# Patient Record
Sex: MALE | Race: WHITE | NOT HISPANIC OR LATINO | Employment: OTHER | URBAN - METROPOLITAN AREA
[De-identification: names, ages, dates, MRNs, and addresses within clinical notes are randomized per-mention and may not be internally consistent; named-entity substitution may affect disease eponyms.]

---

## 2019-05-03 ENCOUNTER — TELEPHONE (OUTPATIENT)
Dept: INTERNAL MEDICINE | Facility: CLINIC | Age: 69
End: 2019-05-03

## 2019-05-03 NOTE — TELEPHONE ENCOUNTER
Patient  Would like to know if we received his records from [place]. He asked if you would called and leave him a voice message.

## 2019-05-07 ENCOUNTER — HOSPITAL ENCOUNTER (OUTPATIENT)
Dept: GENERAL RADIOLOGY | Facility: HOSPITAL | Age: 69
Discharge: HOME | End: 2019-05-07
Admitting: INTERNAL MEDICINE

## 2019-05-07 ENCOUNTER — OFFICE VISIT (OUTPATIENT)
Dept: INTERNAL MEDICINE | Facility: CLINIC | Age: 69
End: 2019-05-07

## 2019-05-07 VITALS
HEIGHT: 71 IN | OXYGEN SATURATION: 99 % | TEMPERATURE: 97.9 F | SYSTOLIC BLOOD PRESSURE: 128 MMHG | DIASTOLIC BLOOD PRESSURE: 86 MMHG | BODY MASS INDEX: 29.9 KG/M2 | HEART RATE: 54 BPM | WEIGHT: 213.6 LBS | RESPIRATION RATE: 18 BRPM

## 2019-05-07 DIAGNOSIS — E78.5 HYPERLIPIDEMIA, UNSPECIFIED HYPERLIPIDEMIA TYPE: ICD-10-CM

## 2019-05-07 DIAGNOSIS — I45.10 RBBB: ICD-10-CM

## 2019-05-07 DIAGNOSIS — E03.9 HYPOTHYROIDISM, UNSPECIFIED TYPE: ICD-10-CM

## 2019-05-07 DIAGNOSIS — R10.12 LUQ PAIN: ICD-10-CM

## 2019-05-07 DIAGNOSIS — N52.31 ERECTILE DYSFUNCTION AFTER RADICAL PROSTATECTOMY: ICD-10-CM

## 2019-05-07 DIAGNOSIS — K63.5 BENIGN COLON POLYP: ICD-10-CM

## 2019-05-07 DIAGNOSIS — G89.29 CHRONIC PAIN OF RIGHT THUMB: ICD-10-CM

## 2019-05-07 DIAGNOSIS — C61 PROSTATE CA (HCC): ICD-10-CM

## 2019-05-07 DIAGNOSIS — I10 ESSENTIAL HYPERTENSION: ICD-10-CM

## 2019-05-07 DIAGNOSIS — M79.644 CHRONIC PAIN OF RIGHT THUMB: ICD-10-CM

## 2019-05-07 DIAGNOSIS — F41.9 ANXIETY: ICD-10-CM

## 2019-05-07 DIAGNOSIS — Z12.11 SCREEN FOR COLON CANCER: Primary | ICD-10-CM

## 2019-05-07 PROBLEM — N52.39 POST-PROCEDURAL ERECTILE DYSFUNCTION: Status: ACTIVE | Noted: 2019-05-07

## 2019-05-07 PROCEDURE — 93000 ELECTROCARDIOGRAM COMPLETE: CPT | Performed by: INTERNAL MEDICINE

## 2019-05-07 PROCEDURE — 99204 OFFICE O/P NEW MOD 45 MIN: CPT | Performed by: INTERNAL MEDICINE

## 2019-05-07 PROCEDURE — 71101 X-RAY EXAM UNILAT RIBS/CHEST: CPT

## 2019-05-07 RX ORDER — L.ACID,CASEI/B.ANIMAL/S.THERMO 16B CELL
1 CAPSULE ORAL DAILY
COMMUNITY
End: 2020-06-30

## 2019-05-07 RX ORDER — LOSARTAN POTASSIUM 100 MG/1
100 TABLET ORAL DAILY
COMMUNITY
Start: 2019-02-12 | End: 2019-05-07 | Stop reason: SDUPTHER

## 2019-05-07 RX ORDER — LEVOTHYROXINE SODIUM 112 UG/1
112 TABLET ORAL DAILY
COMMUNITY
Start: 2019-02-20 | End: 2019-05-07 | Stop reason: SDUPTHER

## 2019-05-07 RX ORDER — MELATONIN 10 MG
TABLET, SUBLINGUAL SUBLINGUAL
COMMUNITY
End: 2019-07-29

## 2019-05-07 RX ORDER — ALPRAZOLAM 0.5 MG/1
0.5 TABLET ORAL DAILY PRN
COMMUNITY
End: 2019-06-25 | Stop reason: SDUPTHER

## 2019-05-07 RX ORDER — OMEPRAZOLE 20 MG/1
20 CAPSULE, DELAYED RELEASE ORAL DAILY
Qty: 30 CAPSULE | Refills: 1 | Status: ON HOLD | OUTPATIENT
Start: 2019-05-07 | End: 2019-07-30 | Stop reason: SDUPTHER

## 2019-05-07 RX ORDER — FOLIC ACID/MULTIVIT,IRON,MINER 0.4MG-18MG
1 TABLET ORAL DAILY
COMMUNITY

## 2019-05-07 RX ORDER — LOSARTAN POTASSIUM 100 MG/1
100 TABLET ORAL DAILY
Qty: 30 TABLET | Refills: 5 | Status: SHIPPED | OUTPATIENT
Start: 2019-05-07 | End: 2019-05-16 | Stop reason: SDUPTHER

## 2019-05-07 RX ORDER — LEVOTHYROXINE SODIUM 112 UG/1
112 TABLET ORAL DAILY
Qty: 30 TABLET | Refills: 5 | Status: SHIPPED | OUTPATIENT
Start: 2019-05-07 | End: 2019-05-15 | Stop reason: SDUPTHER
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
## 2019-05-08 LAB
ALBUMIN SERPL-MCNC: 4.4 G/DL (ref 3.5–5.2)
ALBUMIN/GLOB SERPL: 1.8 G/DL
ALP SERPL-CCNC: 62 U/L (ref 39–117)
ALT SERPL-CCNC: 21 U/L (ref 1–41)
AST SERPL-CCNC: 17 U/L (ref 1–40)
BASOPHILS # BLD AUTO: 0.03 10*3/MM3 (ref 0–0.2)
BASOPHILS NFR BLD AUTO: 0.6 % (ref 0–1.5)
BILIRUB SERPL-MCNC: 0.5 MG/DL (ref 0.2–1.2)
BUN SERPL-MCNC: 18 MG/DL (ref 8–23)
BUN/CREAT SERPL: 20.2 (ref 7–25)
CALCIUM SERPL-MCNC: 9.6 MG/DL (ref 8.6–10.5)
CHLORIDE SERPL-SCNC: 104 MMOL/L (ref 98–107)
CHOLEST SERPL-MCNC: 202 MG/DL (ref 0–200)
CHOLEST/HDLC SERPL: 3.61 {RATIO}
CO2 SERPL-SCNC: 24.2 MMOL/L (ref 22–29)
CREAT SERPL-MCNC: 0.89 MG/DL (ref 0.76–1.27)
EOSINOPHIL # BLD AUTO: 0.11 10*3/MM3 (ref 0–0.4)
EOSINOPHIL NFR BLD AUTO: 2 % (ref 0.3–6.2)
ERYTHROCYTE [DISTWIDTH] IN BLOOD BY AUTOMATED COUNT: 12.2 % (ref 12.3–15.4)
GLOBULIN SER CALC-MCNC: 2.4 GM/DL
GLUCOSE SERPL-MCNC: 98 MG/DL (ref 65–99)
HCT VFR BLD AUTO: 47.5 % (ref 37.5–51)
HDLC SERPL-MCNC: 56 MG/DL (ref 40–60)
HGB BLD-MCNC: 15.8 G/DL (ref 13–17.7)
IMM GRANULOCYTES # BLD AUTO: 0.02 10*3/MM3 (ref 0–0.05)
IMM GRANULOCYTES NFR BLD AUTO: 0.4 % (ref 0–0.5)
LDLC SERPL CALC-MCNC: 131 MG/DL (ref 0–100)
LYMPHOCYTES # BLD AUTO: 1.63 10*3/MM3 (ref 0.7–3.1)
LYMPHOCYTES NFR BLD AUTO: 30.3 % (ref 19.6–45.3)
MCH RBC QN AUTO: 30.4 PG (ref 26.6–33)
MCHC RBC AUTO-ENTMCNC: 33.3 G/DL (ref 31.5–35.7)
MCV RBC AUTO: 91.5 FL (ref 79–97)
MONOCYTES # BLD AUTO: 0.34 10*3/MM3 (ref 0.1–0.9)
MONOCYTES NFR BLD AUTO: 6.3 % (ref 5–12)
NEUTROPHILS # BLD AUTO: 3.25 10*3/MM3 (ref 1.7–7)
NEUTROPHILS NFR BLD AUTO: 60.4 % (ref 42.7–76)
NRBC BLD AUTO-RTO: 0 /100 WBC (ref 0–0.2)
PLATELET # BLD AUTO: 198 10*3/MM3 (ref 140–450)
POTASSIUM SERPL-SCNC: 4.6 MMOL/L (ref 3.5–5.2)
PROT SERPL-MCNC: 6.8 G/DL (ref 6–8.5)
PSA SERPL-MCNC: <0.014 NG/ML (ref 0–4)
RBC # BLD AUTO: 5.19 10*6/MM3 (ref 4.14–5.8)
SODIUM SERPL-SCNC: 142 MMOL/L (ref 136–145)
TRIGL SERPL-MCNC: 77 MG/DL (ref 0–150)
TSH SERPL DL<=0.005 MIU/L-ACNC: 0.62 MIU/ML (ref 0.27–4.2)
VLDLC SERPL CALC-MCNC: 15.4 MG/DL
WBC # BLD AUTO: 5.38 10*3/MM3 (ref 3.4–10.8)

## 2019-05-08 RX ORDER — ATORVASTATIN CALCIUM 20 MG/1
20 TABLET, FILM COATED ORAL DAILY
Qty: 30 TABLET | Refills: 2 | Status: SHIPPED | OUTPATIENT
Start: 2019-05-08 | End: 2019-09-13 | Stop reason: SDUPTHER

## 2019-05-09 ENCOUNTER — TELEPHONE (OUTPATIENT)
Dept: INTERNAL MEDICINE | Facility: CLINIC | Age: 69
End: 2019-05-09

## 2019-05-09 NOTE — TELEPHONE ENCOUNTER
Spoke with pt he would like to fup on labs for atorvastatin would like to come off this when he has his 6 month fup if possible he is going to take this but would like to do exercise and diet

## 2019-05-13 ENCOUNTER — HOSPITAL ENCOUNTER (OUTPATIENT)
Dept: ULTRASOUND IMAGING | Facility: HOSPITAL | Age: 69
Discharge: HOME | End: 2019-05-13
Admitting: INTERNAL MEDICINE

## 2019-05-13 DIAGNOSIS — R10.12 LUQ PAIN: ICD-10-CM

## 2019-05-13 PROCEDURE — 76705 ECHO EXAM OF ABDOMEN: CPT

## 2019-05-15 DIAGNOSIS — E03.9 HYPOTHYROIDISM, UNSPECIFIED TYPE: ICD-10-CM

## 2019-05-15 RX ORDER — LEVOTHYROXINE SODIUM 112 UG/1
112 TABLET ORAL DAILY
Qty: 30 TABLET | Refills: 5 | Status: SHIPPED | OUTPATIENT
Start: 2019-05-15 | End: 2019-11-25 | Stop reason: SDUPTHER

## 2019-05-16 DIAGNOSIS — I10 ESSENTIAL HYPERTENSION: ICD-10-CM

## 2019-05-16 RX ORDER — LOSARTAN POTASSIUM 100 MG/1
100 TABLET ORAL DAILY
Qty: 30 TABLET | Refills: 5 | Status: SHIPPED | OUTPATIENT
Start: 2019-05-16 | End: 2020-05-26

## 2019-06-11 ENCOUNTER — OFFICE VISIT (OUTPATIENT)
Dept: GASTROENTEROLOGY | Facility: CLINIC | Age: 69
End: 2019-06-11

## 2019-06-11 VITALS
WEIGHT: 213.2 LBS | TEMPERATURE: 98.3 F | HEIGHT: 71 IN | DIASTOLIC BLOOD PRESSURE: 96 MMHG | BODY MASS INDEX: 29.85 KG/M2 | SYSTOLIC BLOOD PRESSURE: 134 MMHG

## 2019-06-11 DIAGNOSIS — K63.5 POLYP OF DESCENDING COLON, UNSPECIFIED TYPE: ICD-10-CM

## 2019-06-11 DIAGNOSIS — R10.10 PAIN OF UPPER ABDOMEN: Primary | ICD-10-CM

## 2019-06-11 PROCEDURE — 99204 OFFICE O/P NEW MOD 45 MIN: CPT | Performed by: INTERNAL MEDICINE

## 2019-06-11 NOTE — PROGRESS NOTES
Chief Complaint   Patient presents with   • Pre-op Exam       Best Adam is a 68 y.o. male who presents with left upper quadrant pain, history of polyps    Left upper quadrant pain at the left rib cage present for many months.  Worse with movement.  No associated heartburn reflux or dyspepsia.  Occasionally radiates into the lower quadrants.  He has been diagnosed with costochondritis in the past.  Recently placed on a PPI with no relief of the pain.  No previous EGD in his lifetime  Colonoscopy 7 years ago with one polyp removed due for colonoscopy at this time.  No change in bowel habits or rectal bleeding        Past Medical History:   Diagnosis Date   • Anxiety    • Arthritis    • Benign colon polyp    • Hyperlipidemia    • Hypertension    • Hypothyroidism    • Syncope 2003       Past Surgical History:   Procedure Laterality Date   • COLONOSCOPY  2012    polyps per pt   • INGUINAL HERNIA REPAIR     • PROSTATECTOMY           Current Outpatient Medications:   •  ALPRAZolam (XANAX) 0.5 MG tablet, Take 0.5 mg by mouth Daily As Needed for Anxiety. 1/2 tab po prn , Disp: , Rfl:   •  atorvastatin (LIPITOR) 20 MG tablet, Take 1 tablet by mouth Daily., Disp: 30 tablet, Rfl: 2  •  Cholecalciferol (VITAMIN D3) 83053 units tablet, Take  by mouth., Disp: , Rfl:   •  levothyroxine (SYNTHROID, LEVOTHROID) 112 MCG tablet, Take 1 tablet by mouth Daily., Disp: 30 tablet, Rfl: 5  •  losartan (COZAAR) 100 MG tablet, Take 1 tablet by mouth Daily. 1/2 tab po daily, Disp: 30 tablet, Rfl: 5  •  Multiple Vitamins-Minerals (OCUVITE ADULT 50+ PO), Take  by mouth., Disp: , Rfl:   •  Omega-3 Fatty Acids (FISH OIL MAXIMUM STRENGTH) 1200 MG capsule delayed-release, Take  by mouth., Disp: , Rfl:   •  omeprazole (PRILOSEC) 20 MG capsule, Take 1 capsule by mouth Daily., Disp: 30 capsule, Rfl: 1  •  Probiotic Product (RISAQUAD-2) capsule capsule, Take 1 capsule by mouth Daily., Disp: , Rfl:     No Known Allergies    Social History      Socioeconomic History   • Marital status: Single     Spouse name: Not on file   • Number of children: Not on file   • Years of education: Not on file   • Highest education level: Not on file   Tobacco Use   • Smoking status: Never Smoker   • Smokeless tobacco: Never Used   Substance and Sexual Activity   • Alcohol use: Yes     Frequency: 4 or more times a week     Drinks per session: 1 or 2     Comment: 2 drinks per day   • Drug use: No   • Sexual activity: Defer       Family History   Problem Relation Age of Onset   • Neuropathy Mother    • Alzheimer's disease Father    • Lung cancer Father    • Breast cancer Sister    • Macular degeneration Sister        Review of Systems   Gastrointestinal: Positive for abdominal pain.   All other systems reviewed and are negative.      Vitals:    06/11/19 0934   BP: 134/96   Temp: 98.3 °F (36.8 °C)       Physical Exam   Constitutional: He is oriented to person, place, and time. He appears well-developed and well-nourished.   HENT:   Head: Normocephalic and atraumatic.   Eyes: Conjunctivae and EOM are normal.   Neck: Normal range of motion. No tracheal deviation present.   Cardiovascular: Normal rate and regular rhythm.   Pulmonary/Chest: Effort normal and breath sounds normal. No respiratory distress.   Abdominal: Soft. Bowel sounds are normal. He exhibits no distension and no mass. There is no tenderness. There is no rebound and no guarding.   Musculoskeletal: Normal range of motion.   Neurological: He is alert and oriented to person, place, and time.   Skin: Skin is warm and dry.   Psychiatric: He has a normal mood and affect. Judgment normal.   Nursing note and vitals reviewed.    Problem list    Left upper quadrant tenderness and pain at the left rib cage lower rib cage  Surveillance colonoscopy for polyp      Assessment/Plan    He has point tenderness at the left rib cage, likely a bit of musculoskeletal pain  He can continue PPI for suspected GERD  We will schedule EGD  and colonoscopy for the above issues  In the future if the pain at the left rib cage becomes unbearable I would recommend a referral to a pain specialist for a lidocaine/steroid injection to this area

## 2019-06-25 ENCOUNTER — OFFICE VISIT (OUTPATIENT)
Dept: INTERNAL MEDICINE | Facility: CLINIC | Age: 69
End: 2019-06-25

## 2019-06-25 VITALS
TEMPERATURE: 97.1 F | RESPIRATION RATE: 20 BRPM | HEART RATE: 60 BPM | HEIGHT: 71 IN | SYSTOLIC BLOOD PRESSURE: 124 MMHG | WEIGHT: 210.6 LBS | BODY MASS INDEX: 29.48 KG/M2 | DIASTOLIC BLOOD PRESSURE: 84 MMHG | OXYGEN SATURATION: 99 %

## 2019-06-25 DIAGNOSIS — I10 ESSENTIAL HYPERTENSION: ICD-10-CM

## 2019-06-25 DIAGNOSIS — E78.5 HYPERLIPIDEMIA, UNSPECIFIED HYPERLIPIDEMIA TYPE: Primary | ICD-10-CM

## 2019-06-25 DIAGNOSIS — E03.9 HYPOTHYROIDISM, UNSPECIFIED TYPE: ICD-10-CM

## 2019-06-25 DIAGNOSIS — D36.9 TUBULAR ADENOMA: ICD-10-CM

## 2019-06-25 DIAGNOSIS — F41.9 ANXIETY: ICD-10-CM

## 2019-06-25 PROCEDURE — 90670 PCV13 VACCINE IM: CPT | Performed by: INTERNAL MEDICINE

## 2019-06-25 PROCEDURE — 99214 OFFICE O/P EST MOD 30 MIN: CPT | Performed by: INTERNAL MEDICINE

## 2019-06-25 PROCEDURE — G0009 ADMIN PNEUMOCOCCAL VACCINE: HCPCS | Performed by: INTERNAL MEDICINE

## 2019-06-25 RX ORDER — ALPRAZOLAM 0.5 MG/1
0.5 TABLET ORAL DAILY PRN
Qty: 30 TABLET | Refills: 0 | Status: SHIPPED | OUTPATIENT
Start: 2019-06-25 | End: 2019-06-27 | Stop reason: SDUPTHER

## 2019-06-25 NOTE — PROGRESS NOTES
Subjective        Chief Complaint   Patient presents with   • Follow-up     fup labs US spleen med eval refills discuss prilosec and statin    • Abdominal Pain   • Heartburn           Best Adam is a 68 y.o. male who presents for    Patient Active Problem List   Diagnosis   • Hyperlipidemia   • Hypertension   • Hypothyroidism   • Anxiety   • Chronic pain of right thumb   • Prostate CA (CMS/HCC)   • Post-procedural erectile dysfunction   • RBBB   • Pain of upper abdomen   • Tubular adenoma       History of Present Illness     He saw Dr. Ignacio; he is to have an EGD and colonoscopy at the end of July. He had an appointment with Dr. Salazar's NP who filled his Trimix. His BP with Dr. Salazar was slightly increased. He has not been checking his BP at home. He has been doing well emotionally. He does not have reflux. He is down to 3 tabs of Xanax. Denies chest pain or dyspnea.  No Known Allergies    Current Outpatient Medications on File Prior to Visit   Medication Sig Dispense Refill   • atorvastatin (LIPITOR) 20 MG tablet Take 1 tablet by mouth Daily. 30 tablet 2   • Cholecalciferol (VITAMIN D3) 27582 units tablet Take  by mouth.     • levothyroxine (SYNTHROID, LEVOTHROID) 112 MCG tablet Take 1 tablet by mouth Daily. 30 tablet 5   • losartan (COZAAR) 100 MG tablet Take 1 tablet by mouth Daily. 1/2 tab po daily 30 tablet 5   • Multiple Vitamins-Minerals (OCUVITE ADULT 50+ PO) Take  by mouth.     • Omega-3 Fatty Acids (FISH OIL MAXIMUM STRENGTH) 1200 MG capsule delayed-release Take  by mouth.     • omeprazole (PRILOSEC) 20 MG capsule Take 1 capsule by mouth Daily. 30 capsule 1   • Probiotic Product (RISAQUAD-2) capsule capsule Take 1 capsule by mouth Daily.     • [DISCONTINUED] ALPRAZolam (XANAX) 0.5 MG tablet Take 0.5 mg by mouth Daily As Needed for Anxiety. 1/2 tab po prn        No current facility-administered medications on file prior to visit.        Past Medical History:   Diagnosis Date   • Anxiety    • Arthritis   "  • Hyperlipidemia    • Hypertension    • Hypothyroidism    • Syncope 2003       Past Surgical History:   Procedure Laterality Date   • COLONOSCOPY  2012    polyps per pt   • INGUINAL HERNIA REPAIR     • PROSTATECTOMY         Family History   Problem Relation Age of Onset   • Neuropathy Mother    • Alzheimer's disease Father    • Lung cancer Father    • Breast cancer Sister    • Macular degeneration Sister        Social History     Socioeconomic History   • Marital status: Single     Spouse name: Not on file   • Number of children: Not on file   • Years of education: Not on file   • Highest education level: Not on file   Tobacco Use   • Smoking status: Never Smoker   • Smokeless tobacco: Never Used   Substance and Sexual Activity   • Alcohol use: Yes     Frequency: 4 or more times a week     Drinks per session: 1 or 2     Comment: 2 drinks per day   • Drug use: No   • Sexual activity: Defer           The following portions of the patient's history were reviewed and updated as appropriate: problem list, allergies, current medications, past medical history, past family history, past social history and past surgical history.    Review of Systems   Respiratory: Negative for shortness of breath.    Cardiovascular: Negative for chest pain.       Immunization History   Administered Date(s) Administered   • Flu Vaccine High Dose PF 65YR+ 10/01/2018   • Pneumococcal Conjugate 13-Valent (PCV13) 06/25/2019       Objective   Vitals:    06/25/19 1503   BP: 124/84   Pulse: 60   Resp: 20   Temp: 97.1 °F (36.2 °C)   TempSrc: Oral   SpO2: 99%   Weight: 95.5 kg (210 lb 9.6 oz)   Height: 180.3 cm (71\")     Physical Exam   Constitutional: He appears well-developed and well-nourished.   HENT:   Head: Normocephalic and atraumatic.   Cardiovascular: Normal rate, regular rhythm, S1 normal, S2 normal and normal heart sounds.   Pulmonary/Chest: Effort normal and breath sounds normal.   Neurological: He is alert.   Skin: Skin is warm. "   Psychiatric: He has a normal mood and affect.   Vitals reviewed.      Procedures    Assessment/Plan   Best was seen today for follow-up, abdominal pain and heartburn.    Diagnoses and all orders for this visit:    Hyperlipidemia, unspecified hyperlipidemia type  -     Comprehensive Metabolic Panel; Future  -     Lipid Panel With / Chol / HDL Ratio; Future    Essential hypertension    Hypothyroidism, unspecified type  -     TSH; Future    Anxiety  -     ALPRAZolam (XANAX) 0.5 MG tablet; Take 1 tablet by mouth Daily As Needed for Anxiety. 1/2 tab po prn    Tubular adenoma    Other orders  -     Pneumococcal Conjugate Vaccine 13-Valent All (PCV13)             Reviewed labs, xray, USN and consultant notes. Check LDL in about 6 weeks. BP is good. Refill Xanax. He is to have an EGD and cscope in July. Prevnar today. Discussed other shots he is due for, especially hep A. Old records reviewed.    Return in about 6 months (around 12/25/2019).

## 2019-06-26 ENCOUNTER — TELEPHONE (OUTPATIENT)
Dept: INTERNAL MEDICINE | Facility: CLINIC | Age: 69
End: 2019-06-26

## 2019-06-26 NOTE — TELEPHONE ENCOUNTER
Pharmacy needs clarification on Xanax directions. It says two different things on the SIG. Thanks

## 2019-06-27 DIAGNOSIS — F41.9 ANXIETY: ICD-10-CM

## 2019-06-27 RX ORDER — ALPRAZOLAM 0.5 MG/1
0.25 TABLET ORAL DAILY PRN
Qty: 30 TABLET | Refills: 0 | Status: SHIPPED | OUTPATIENT
Start: 2019-06-27 | End: 2020-04-29

## 2019-06-27 NOTE — TELEPHONE ENCOUNTER
This encounter was created in error - please disregard.  This encounter was created in error - please disregard.

## 2019-06-27 NOTE — ADDENDUM NOTE
Addended by: JONO SIMMONS on: 6/27/2019 12:54 PM     Modules accepted: Level of Service, SmartSet

## 2019-07-29 RX ORDER — MELATONIN
1000 DAILY
COMMUNITY

## 2019-07-30 ENCOUNTER — HOSPITAL ENCOUNTER (OUTPATIENT)
Facility: HOSPITAL | Age: 69
Setting detail: HOSPITAL OUTPATIENT SURGERY
Discharge: HOME | End: 2019-07-30
Attending: INTERNAL MEDICINE | Admitting: INTERNAL MEDICINE

## 2019-07-30 ENCOUNTER — ANESTHESIA (OUTPATIENT)
Dept: GASTROENTEROLOGY | Facility: HOSPITAL | Age: 69
End: 2019-07-30

## 2019-07-30 ENCOUNTER — ANESTHESIA EVENT (OUTPATIENT)
Dept: GASTROENTEROLOGY | Facility: HOSPITAL | Age: 69
End: 2019-07-30

## 2019-07-30 VITALS
BODY MASS INDEX: 28.36 KG/M2 | SYSTOLIC BLOOD PRESSURE: 145 MMHG | DIASTOLIC BLOOD PRESSURE: 93 MMHG | RESPIRATION RATE: 12 BRPM | TEMPERATURE: 97.8 F | OXYGEN SATURATION: 97 % | WEIGHT: 203.31 LBS | HEART RATE: 56 BPM

## 2019-07-30 DIAGNOSIS — K63.5 POLYP OF DESCENDING COLON, UNSPECIFIED TYPE: ICD-10-CM

## 2019-07-30 DIAGNOSIS — R10.10 PAIN OF UPPER ABDOMEN: ICD-10-CM

## 2019-07-30 DIAGNOSIS — R10.12 LUQ PAIN: ICD-10-CM

## 2019-07-30 PROCEDURE — S0260 H&P FOR SURGERY: HCPCS | Performed by: INTERNAL MEDICINE

## 2019-07-30 PROCEDURE — 45385 COLONOSCOPY W/LESION REMOVAL: CPT | Performed by: INTERNAL MEDICINE

## 2019-07-30 PROCEDURE — 88305 TISSUE EXAM BY PATHOLOGIST: CPT | Performed by: INTERNAL MEDICINE

## 2019-07-30 PROCEDURE — 43239 EGD BIOPSY SINGLE/MULTIPLE: CPT | Performed by: INTERNAL MEDICINE

## 2019-07-30 PROCEDURE — 25010000002 PROPOFOL 10 MG/ML EMULSION: Performed by: NURSE ANESTHETIST, CERTIFIED REGISTERED

## 2019-07-30 PROCEDURE — 45380 COLONOSCOPY AND BIOPSY: CPT | Performed by: INTERNAL MEDICINE

## 2019-07-30 RX ORDER — SODIUM CHLORIDE, SODIUM LACTATE, POTASSIUM CHLORIDE, CALCIUM CHLORIDE 600; 310; 30; 20 MG/100ML; MG/100ML; MG/100ML; MG/100ML
1000 INJECTION, SOLUTION INTRAVENOUS CONTINUOUS
Status: DISCONTINUED | OUTPATIENT
Start: 2019-07-30 | End: 2019-07-30 | Stop reason: HOSPADM

## 2019-07-30 RX ORDER — GLYCOPYRROLATE 0.2 MG/ML
INJECTION INTRAMUSCULAR; INTRAVENOUS AS NEEDED
Status: DISCONTINUED | OUTPATIENT
Start: 2019-07-30 | End: 2019-07-30 | Stop reason: SURG

## 2019-07-30 RX ORDER — PROPOFOL 10 MG/ML
VIAL (ML) INTRAVENOUS CONTINUOUS PRN
Status: DISCONTINUED | OUTPATIENT
Start: 2019-07-30 | End: 2019-07-30 | Stop reason: SURG

## 2019-07-30 RX ORDER — PROPOFOL 10 MG/ML
VIAL (ML) INTRAVENOUS AS NEEDED
Status: DISCONTINUED | OUTPATIENT
Start: 2019-07-30 | End: 2019-07-30 | Stop reason: SURG

## 2019-07-30 RX ORDER — OMEPRAZOLE 20 MG/1
40 CAPSULE, DELAYED RELEASE ORAL DAILY
Qty: 30 CAPSULE | Refills: 6 | Status: SHIPPED | OUTPATIENT
Start: 2019-07-30 | End: 2020-09-10

## 2019-07-30 RX ADMIN — SODIUM CHLORIDE, POTASSIUM CHLORIDE, SODIUM LACTATE AND CALCIUM CHLORIDE 1000 ML: 600; 310; 30; 20 INJECTION, SOLUTION INTRAVENOUS at 14:45

## 2019-07-30 RX ADMIN — PROPOFOL 100 MG: 10 INJECTION, EMULSION INTRAVENOUS at 16:01

## 2019-07-30 RX ADMIN — GLYCOPYRROLATE 0.2 MCG: 0.2 INJECTION INTRAMUSCULAR; INTRAVENOUS at 16:00

## 2019-07-30 RX ADMIN — PROPOFOL 120 MCG/KG/MIN: 10 INJECTION, EMULSION INTRAVENOUS at 16:02

## 2019-07-30 NOTE — ANESTHESIA PREPROCEDURE EVALUATION
Anesthesia Evaluation     Patient summary reviewed and Nursing notes reviewed   NPO Solid Status: > 8 hours             Airway   Mallampati: II  TM distance: >3 FB  Neck ROM: full  no difficulty expected  Dental - normal exam     Pulmonary - negative pulmonary ROS and normal exam   Cardiovascular - normal exam    (+) hypertension, dysrhythmias,       Neuro/Psych- negative ROS  GI/Hepatic/Renal/Endo    (+)   hypothyroidism,     Musculoskeletal (-) negative ROS    Abdominal  - normal exam   Substance History - negative use     OB/GYN negative ob/gyn ROS         Other                        Anesthesia Plan    ASA 2     MAC     intravenous induction   Anesthetic plan, all risks, benefits, and alternatives have been provided, discussed and informed consent has been obtained with: patient.    Plan discussed with CRNA.

## 2019-07-30 NOTE — ANESTHESIA POSTPROCEDURE EVALUATION
Patient: Best Adam    Procedure Summary     Date:  07/30/19 Room / Location:   [facility]    Anesthesia Start:  1557 Anesthesia Stop:  1629    Procedures:       ESOPHAGOGASTRODUODENOSCOPY with biopsies (N/A Esophagus)      COLONOSCOPY to cecum and t.i. with hot snare polypectomies (N/A ) Diagnosis:       Pain of upper abdomen      Polyp of descending colon, unspecified type      (Pain of upper abdomen [R10.10])      (Polyp of descending colon, unspecified type [D12.4])    Surgeon:  Candelario Mills MD Provider:  Maximo Brewer MD    Anesthesia Type:  MAC ASA Status:  2          Anesthesia Type: MAC  Last vitals  BP   145/93 (07/30/19 1649)   Temp   36.6 °C (97.8 °F) (07/30/19 1433)   Pulse   56 (07/30/19 1649)   Resp   12 (07/30/19 1649)     SpO2   97 % (07/30/19 1649)     Post Anesthesia Care and Evaluation    Patient location during evaluation: bedside  Pain management: adequate  Airway patency: patent  Anesthetic complications: No anesthetic complications    Cardiovascular status: acceptable  Respiratory status: acceptable  Hydration status: acceptable

## 2019-08-01 LAB
CYTO UR: NORMAL
LAB AP CASE REPORT: NORMAL
PATH REPORT.FINAL DX SPEC: NORMAL
PATH REPORT.GROSS SPEC: NORMAL

## 2019-08-02 DIAGNOSIS — E78.5 HYPERLIPIDEMIA, UNSPECIFIED HYPERLIPIDEMIA TYPE: ICD-10-CM

## 2019-08-02 DIAGNOSIS — E03.9 HYPOTHYROIDISM, UNSPECIFIED TYPE: ICD-10-CM

## 2019-08-04 NOTE — PROGRESS NOTES
H. pylori infection  Mosley's esophagus  Precancerous polyps  EGD and colonoscopy recall 3 years    Please call in amoxicillin 1 g p.o. twice daily for 14 days  Biaxin 500 mg p.o. twice daily for 14 days  Omeprazole 20 mill grams p.o. twice daily for 14 days  Please schedule an H. pylori breath test in 6 weeks off of all antibiotics and PPI 2 weeks to confirm eradication of H. pylori    Office visit Dee 3 months

## 2019-08-06 ENCOUNTER — TELEPHONE (OUTPATIENT)
Dept: GASTROENTEROLOGY | Facility: CLINIC | Age: 69
End: 2019-08-06

## 2019-08-06 DIAGNOSIS — A04.8 H. PYLORI INFECTION: Primary | ICD-10-CM

## 2019-08-06 LAB
CHOLEST SERPL-MCNC: 139 MG/DL (ref 0–200)
CHOLEST/HDLC SERPL: 2.67 {RATIO}
HDLC SERPL-MCNC: 52 MG/DL (ref 40–60)
LDLC SERPL CALC-MCNC: 73 MG/DL (ref 0–100)
TRIGL SERPL-MCNC: 68 MG/DL (ref 0–150)
VLDLC SERPL CALC-MCNC: 13.6 MG/DL

## 2019-08-06 NOTE — TELEPHONE ENCOUNTER
Notes recorded by Candelario Mills MD on 8/4/2019 at 9:40 AM EDT  H. pylori infection  Mosley's esophagus  Precancerous polyps  EGD and colonoscopy recall 3 years    Please call in amoxicillin 1 g p.o. twice daily for 14 days  Biaxin 500 mg p.o. twice daily for 14 days  Omeprazole 20 mill grams p.o. twice daily for 14 days  Please schedule an H. pylori breath test in 6 weeks off of all antibiotics and PPI 2 weeks to confirm eradication of H. pylori    Office visit Dee 3 months

## 2019-08-06 NOTE — TELEPHONE ENCOUNTER
Pt stopped by the office for path results. Advised of the path result note from Dr Mills. Explained both diagnoses to pt and how/why we treat them. Advised will call the two antibiotics into his pharmacy for him and he can separate his doses of omeprazole out while on the course of treatment, then take both doses at one time when course is done. Advised he will receive a reminder card in them mail when time for his scopes. All questions answered. Pt verb understanding. Lab appt scheduled for 9/17/19 at 0900. Pt aware to hod omeprazole and all abx for 2 weeks prior to appt. Follow-up appt rescheduled to 11/7 at 0930.     Health Maintenance updated to reflect C/S due 7/2022.       Called [facility] and spoke with the pharmacist. Gave verbal orders for:  Amoxicillin 500mg 2 PO BID x 14 days #56 with no refill  Clarithromycin 500mg 1 PO BID x 14 days #28 with no refill   Changed previously e-scribed omeprazole order to 20mg 2 PO daily #60 with 6 refills.

## 2019-08-07 ENCOUNTER — TELEPHONE (OUTPATIENT)
Dept: INTERNAL MEDICINE | Facility: CLINIC | Age: 69
End: 2019-08-07

## 2019-08-07 NOTE — TELEPHONE ENCOUNTER
Called pt back. Pt states he has a number of questions regarding his test results. On the amoxicillin, he was given a script for 2 capsules twice a day. Is that correct? Advised yes, he needs to take 2 buy mouth twice daily. He will be traveling stating tomorrow, he will be back on the 19th. He would rather wait to get back in town before taking meds. Advised that will be just fine. Multiple more questions answered. Lab appt rescheduled to 9/30 at 10AM.

## 2019-08-07 NOTE — TELEPHONE ENCOUNTER
Patient called asking for Sejal regarding test results. He can be reached at [phone] at your convenience.

## 2019-09-13 RX ORDER — ATORVASTATIN CALCIUM 20 MG/1
TABLET, FILM COATED ORAL
Qty: 90 TABLET | Refills: 0 | Status: SHIPPED | OUTPATIENT
Start: 2019-09-13 | End: 2019-09-13 | Stop reason: SDUPTHER

## 2019-09-13 RX ORDER — ATORVASTATIN CALCIUM 20 MG/1
20 TABLET, FILM COATED ORAL DAILY
Qty: 90 TABLET | Refills: 1 | Status: SHIPPED | OUTPATIENT
Start: 2019-09-13 | End: 2019-09-16 | Stop reason: SDUPTHER

## 2019-09-16 RX ORDER — ATORVASTATIN CALCIUM 20 MG/1
20 TABLET, FILM COATED ORAL DAILY
Qty: 90 TABLET | Refills: 1 | Status: SHIPPED | OUTPATIENT
Start: 2019-09-16 | End: 2019-12-30 | Stop reason: SDUPTHER

## 2019-10-01 LAB — UREA BREATH TEST QL: NEGATIVE

## 2019-11-07 ENCOUNTER — OFFICE VISIT (OUTPATIENT)
Dept: GASTROENTEROLOGY | Facility: CLINIC | Age: 69
End: 2019-11-07

## 2019-11-07 VITALS
SYSTOLIC BLOOD PRESSURE: 138 MMHG | BODY MASS INDEX: 30.15 KG/M2 | WEIGHT: 215.4 LBS | DIASTOLIC BLOOD PRESSURE: 82 MMHG | TEMPERATURE: 98 F | HEIGHT: 71 IN

## 2019-11-07 DIAGNOSIS — K63.5 POLYP OF COLON, UNSPECIFIED PART OF COLON, UNSPECIFIED TYPE: ICD-10-CM

## 2019-11-07 DIAGNOSIS — K22.70 BARRETT'S ESOPHAGUS WITHOUT DYSPLASIA: ICD-10-CM

## 2019-11-07 DIAGNOSIS — Z86.19 HISTORY OF HELICOBACTER PYLORI INFECTION: ICD-10-CM

## 2019-11-07 DIAGNOSIS — R10.12 LUQ PAIN: Primary | ICD-10-CM

## 2019-11-07 PROCEDURE — 99214 OFFICE O/P EST MOD 30 MIN: CPT | Performed by: NURSE PRACTITIONER

## 2019-11-07 NOTE — PROGRESS NOTES
Chief Complaint   Patient presents with   • Discomfort of upper abdomen     HPI    Best Adam is a  69 y.o. male here for a follow up visit for left upper quadrant pain    This is an established patient of Dr. Mills's, new to me.  He was seen the office in June for complaints of left upper quadrant pain.  He also has a history of colon polyps.  He subsequently underwent endoscopic evaluation on 7/30/2019 which I reviewed:    EGD--irregular Z line, normal stomach, normal duodenum.  Colonoscopy--polyps, diverticulosis, nonbleeding internal hemorrhoids.  Pathology-- + H. pylori, Mosley's esophagus, precancerous polyps.    Patient was treated with amoxicillin and Biaxin.  Follow-up breath test was negative for H. pylori dated 9/30/2019.    Recall EGD and colonoscopy 3 years.    Today he reports improvement of left upper quadrant abdominal pain more of a mild ache/nuisance now.  Much improved after course of antibiotics to treat H. pylori infection.  He does admit that he is taking his omeprazole with food in the afternoon.  Denies symptoms of acid reflux/heartburn.  No dysphagia.  Appetite is good.  His weight is stable.    Bowels are moving daily, mushy.  Occasionally experiences incomplete evacuation.  Denies rectal bleeding.  He is currently on align probiotics.    Past Medical History:   Diagnosis Date   • Anxiety    • Arthritis    • Awareness under anesthesia     DURING COLONSCOPY   • Epigastric pain     FOR A COUPLE OF YEARS   • History of colon polyps    • History of prostate cancer 2011   • History of syncope 2006   • Hyperlipidemia    • Hypertension    • Hypothyroidism        Past Surgical History:   Procedure Laterality Date   • COLONOSCOPY  2012    polyps per pt   • COLONOSCOPY N/A 7/30/2019    tics, NBIH, TA x 2, HP x 1   • ENDOSCOPY N/A 7/30/2019    Z line irregular, 39-41 cm from incisors, Chronic H pylori gastritis, Mosley's esophagus   • INGUINAL HERNIA REPAIR Right 2013   • PROSTATECTOMY  2011        Scheduled Meds:  Outpatient Encounter Medications as of 11/7/2019   Medication Sig Dispense Refill   • ALPRAZolam (XANAX) 0.5 MG tablet Take 0.5 tablets by mouth Daily As Needed for Anxiety. 1/2 tab po prn 30 tablet 0   • atorvastatin (LIPITOR) 20 MG tablet Take 1 tablet by mouth Daily. 90 tablet 1   • cholecalciferol (VITAMIN D3) 1000 units tablet Take 1,000 Units by mouth Daily.     • levothyroxine (SYNTHROID, LEVOTHROID) 112 MCG tablet Take 1 tablet by mouth Daily. 30 tablet 5   • losartan (COZAAR) 100 MG tablet Take 1 tablet by mouth Daily. 1/2 tab po daily (Patient taking differently: Take 50 mg by mouth Daily. 1/2 tab po daily) 30 tablet 5   • Multiple Vitamins-Minerals (OCUVITE ADULT 50+ PO) Take 1 tablet by mouth Daily.     • Omega-3 Fatty Acids (FISH OIL MAXIMUM STRENGTH) 1200 MG capsule delayed-release Take 1 tablet by mouth Daily.     • omeprazole (PRILOSEC) 20 MG capsule Take 2 capsules by mouth Daily. 30 capsule 6   • Probiotic Product (RISAQUAD-2) capsule capsule Take 1 capsule by mouth Daily.       No facility-administered encounter medications on file as of 11/7/2019.        Continuous Infusions:  No current facility-administered medications for this visit.     PRN Meds:.    No Known Allergies    Social History     Socioeconomic History   • Marital status: Single     Spouse name: Not on file   • Number of children: Not on file   • Years of education: Not on file   • Highest education level: Not on file   Tobacco Use   • Smoking status: Never Smoker   • Smokeless tobacco: Never Used   Substance and Sexual Activity   • Alcohol use: Yes     Frequency: 4 or more times a week     Drinks per session: 1 or 2     Comment: 2 drinks per day   • Drug use: No   • Sexual activity: Defer       Family History   Problem Relation Age of Onset   • Neuropathy Mother    • Alzheimer's disease Father    • Lung cancer Father    • Breast cancer Sister    • Macular degeneration Sister    • Malig Hyperthermia Neg Hx         Review of Systems   Constitutional: Negative for activity change, appetite change, fatigue, fever and unexpected weight change.   HENT: Negative for trouble swallowing.    Respiratory: Negative for apnea, cough, choking, chest tightness, shortness of breath and wheezing.    Cardiovascular: Negative for chest pain, palpitations and leg swelling.   Gastrointestinal: Positive for abdominal pain. Negative for abdominal distention, anal bleeding, blood in stool, constipation, diarrhea, nausea, rectal pain and vomiting.       Vitals:    11/07/19 0932   BP: 138/82   Temp: 98 °F (36.7 °C)       Physical Exam   Constitutional: He is oriented to person, place, and time. He appears well-developed and well-nourished.   Eyes: Pupils are equal, round, and reactive to light.   Cardiovascular: Normal rate, regular rhythm and normal heart sounds.   Pulmonary/Chest: Effort normal and breath sounds normal. No respiratory distress. He has no wheezes.   Abdominal: Soft. Bowel sounds are normal. He exhibits no distension and no mass. There is no tenderness. There is no guarding. No hernia.   Musculoskeletal: Normal range of motion.   Neurological: He is alert and oriented to person, place, and time.   Skin: Skin is warm and dry. Capillary refill takes less than 2 seconds.   Psychiatric: He has a normal mood and affect. His behavior is normal.       No images are attached to the encounter.    Best was seen today for discomfort of upper abdomen.    Diagnoses and all orders for this visit:    LUQ pain    History of Helicobacter pylori infection    Polyp of colon, unspecified part of colon, unspecified type    Mosley's esophagus without dysplasia    Assessment/plan    Much improved left upper quadrant abdominal pain status post treatment for H. pylori infection.  He was also found to have Mosley's esophagus on upper endoscopy.  I reviewed procedural findings and pathology at length with the patient.  All questions were  answered.    At this point I recommend that he take omeprazole 40 mg in the morning at least 30 minutes prior to eating.  We also discussed trial of FD guard 2 tablets twice daily for relief of left upper quadrant discomfort.  I instructed him to avoid NSAIDs.    Regarding feelings of incomplete evacuation, recommend that he continue daily probiotic align and add fiber supplement such as metamucil, Benefiber, or FiberCon.    EGD and colonoscopy for surveillance purposes in 3 years.    Return to clinic 3 months.  Call with questions or concerns in the interim.    (I spent a total of 30 minutes in direct patient care including face-to-face examination. 25 minutes were spent in coordination of care and counseling the patient extensively on procedural findings, pathology, PPI therapy, benefits of probiotics and fiber supplementation.)

## 2019-11-25 ENCOUNTER — TELEPHONE (OUTPATIENT)
Dept: INTERNAL MEDICINE | Facility: CLINIC | Age: 69
End: 2019-11-25

## 2019-11-25 DIAGNOSIS — E03.9 HYPOTHYROIDISM, UNSPECIFIED TYPE: ICD-10-CM

## 2019-11-25 RX ORDER — LEVOTHYROXINE SODIUM 112 UG/1
112 TABLET ORAL DAILY
Qty: 30 TABLET | Refills: 5 | Status: SHIPPED | OUTPATIENT
Start: 2019-11-25 | End: 2020-05-26

## 2019-11-25 NOTE — TELEPHONE ENCOUNTER
Patient came into office requesting a refill on his Levothyroxin 112mcg one po qd #90  Pharmacy : [facility] [address] [facility]  Phone #: [phone]  Please contact patient once sent to pharmacy    Patient will be out of medication by Thanksgiving.

## 2019-12-09 ENCOUNTER — TELEPHONE (OUTPATIENT)
Dept: INTERNAL MEDICINE | Facility: CLINIC | Age: 69
End: 2019-12-09

## 2019-12-10 DIAGNOSIS — E03.9 HYPOTHYROIDISM, UNSPECIFIED TYPE: Primary | ICD-10-CM

## 2019-12-20 LAB
BASOPHILS # BLD AUTO: 0.03 10*3/MM3 (ref 0–0.2)
BASOPHILS NFR BLD AUTO: 0.5 % (ref 0–1.5)
EOSINOPHIL # BLD AUTO: 0.09 10*3/MM3 (ref 0–0.4)
EOSINOPHIL NFR BLD AUTO: 1.4 % (ref 0.3–6.2)
ERYTHROCYTE [DISTWIDTH] IN BLOOD BY AUTOMATED COUNT: 12.4 % (ref 12.3–15.4)
HCT VFR BLD AUTO: 44.8 % (ref 37.5–51)
HGB BLD-MCNC: 15.4 G/DL (ref 13–17.7)
IMM GRANULOCYTES # BLD AUTO: 0.02 10*3/MM3 (ref 0–0.05)
IMM GRANULOCYTES NFR BLD AUTO: 0.3 % (ref 0–0.5)
LYMPHOCYTES # BLD AUTO: 1.79 10*3/MM3 (ref 0.7–3.1)
LYMPHOCYTES NFR BLD AUTO: 27.2 % (ref 19.6–45.3)
MCH RBC QN AUTO: 29.8 PG (ref 26.6–33)
MCHC RBC AUTO-ENTMCNC: 34.4 G/DL (ref 31.5–35.7)
MCV RBC AUTO: 86.8 FL (ref 79–97)
MONOCYTES # BLD AUTO: 0.49 10*3/MM3 (ref 0.1–0.9)
MONOCYTES NFR BLD AUTO: 7.4 % (ref 5–12)
NEUTROPHILS # BLD AUTO: 4.16 10*3/MM3 (ref 1.7–7)
NEUTROPHILS NFR BLD AUTO: 63.2 % (ref 42.7–76)
NRBC BLD AUTO-RTO: 0 /100 WBC (ref 0–0.2)
PLATELET # BLD AUTO: 237 10*3/MM3 (ref 140–450)
RBC # BLD AUTO: 5.16 10*6/MM3 (ref 4.14–5.8)
TSH SERPL DL<=0.005 MIU/L-ACNC: 0.72 UIU/ML (ref 0.27–4.2)
WBC # BLD AUTO: 6.58 10*3/MM3 (ref 3.4–10.8)

## 2019-12-30 ENCOUNTER — OFFICE VISIT (OUTPATIENT)
Dept: INTERNAL MEDICINE | Facility: CLINIC | Age: 69
End: 2019-12-30

## 2019-12-30 VITALS
HEIGHT: 71 IN | WEIGHT: 213.8 LBS | BODY MASS INDEX: 29.93 KG/M2 | DIASTOLIC BLOOD PRESSURE: 86 MMHG | SYSTOLIC BLOOD PRESSURE: 128 MMHG

## 2019-12-30 DIAGNOSIS — E03.9 HYPOTHYROIDISM, UNSPECIFIED TYPE: Primary | ICD-10-CM

## 2019-12-30 DIAGNOSIS — E78.5 HYPERLIPIDEMIA, UNSPECIFIED HYPERLIPIDEMIA TYPE: ICD-10-CM

## 2019-12-30 DIAGNOSIS — R10.10 PAIN OF UPPER ABDOMEN: ICD-10-CM

## 2019-12-30 DIAGNOSIS — I10 ESSENTIAL HYPERTENSION: ICD-10-CM

## 2019-12-30 DIAGNOSIS — Z11.59 NEED FOR HEPATITIS C SCREENING TEST: ICD-10-CM

## 2019-12-30 PROCEDURE — 99214 OFFICE O/P EST MOD 30 MIN: CPT | Performed by: INTERNAL MEDICINE

## 2019-12-30 RX ORDER — ATORVASTATIN CALCIUM 20 MG/1
20 TABLET, FILM COATED ORAL DAILY
Qty: 90 TABLET | Refills: 1 | Status: SHIPPED | OUTPATIENT
Start: 2019-12-30 | End: 2020-11-13 | Stop reason: SDUPTHER

## 2019-12-30 NOTE — PROGRESS NOTES
Subjective        Chief Complaint   Patient presents with   • Hyperlipidemia     fup gerd labs cholesterol            Best Adam is a 69 y.o. male who presents for    Patient Active Problem List   Diagnosis   • Hyperlipidemia   • Hypertension   • Hypothyroidism   • Anxiety   • Chronic pain of right thumb   • Prostate CA (CMS/HCC)   • Post-procedural erectile dysfunction   • RBBB   • Pain of upper abdomen   • Tubular adenoma       History of Present Illness     He had an EGD and cscope; he was diagnosed with Barretts and colon polyps. He was diagnosed with H. Pylori and he was treated with abx and he had a negative f/u breath test. He denies chest pain, dyspnea, or GERD. He has not been checking his BP. He was started on FD Guard for LUQ pain and it is slightly better. He thinks it is only 10 percent better. After his EGD and cscope, the pain was a lot better. The pain is worse if he over eats. He can feel bloated but he does not have any nausea. The pain is more noticeable in the am and late in the day. The pain is dull. He is doing CBT for anxiety with esa and grace; it has helped.  No Known Allergies    Current Outpatient Medications on File Prior to Visit   Medication Sig Dispense Refill   • ALPRAZolam (XANAX) 0.5 MG tablet Take 0.5 tablets by mouth Daily As Needed for Anxiety. 1/2 tab po prn 30 tablet 0   • cholecalciferol (VITAMIN D3) 1000 units tablet Take 1,000 Units by mouth Daily.     • levothyroxine (SYNTHROID, LEVOTHROID) 112 MCG tablet Take 1 tablet by mouth Daily. 30 tablet 5   • losartan (COZAAR) 100 MG tablet Take 1 tablet by mouth Daily. 1/2 tab po daily (Patient taking differently: Take 50 mg by mouth Daily. 1/2 tab po daily) 30 tablet 5   • Multiple Vitamins-Minerals (OCUVITE ADULT 50+ PO) Take 1 tablet by mouth Daily.     • Omega-3 Fatty Acids (FISH OIL MAXIMUM STRENGTH) 1200 MG capsule delayed-release Take 1 tablet by mouth Daily.     • omeprazole (PRILOSEC) 20 MG capsule Take 2  capsules by mouth Daily. 30 capsule 6   • Probiotic Product (RISAQUAD-2) capsule capsule Take 1 capsule by mouth Daily.     • [DISCONTINUED] atorvastatin (LIPITOR) 20 MG tablet Take 1 tablet by mouth Daily. 90 tablet 1     No current facility-administered medications on file prior to visit.        Past Medical History:   Diagnosis Date   • Anxiety    • Arthritis    • Awareness under anesthesia     DURING COLONSCOPY   • Epigastric pain     FOR A COUPLE OF YEARS   • History of colon polyps    • History of prostate cancer 2011   • History of syncope 2006   • Hyperlipidemia    • Hypertension    • Hypothyroidism        Past Surgical History:   Procedure Laterality Date   • COLONOSCOPY  2012    polyps per pt   • COLONOSCOPY N/A 7/30/2019    tics, NBIH, TA x 2, HP x 1   • ENDOSCOPY N/A 7/30/2019    Z line irregular, 39-41 cm from incisors, Chronic H pylori gastritis, Mosley's esophagus   • INGUINAL HERNIA REPAIR Right 2013   • PROSTATECTOMY  2011       Family History   Problem Relation Age of Onset   • Neuropathy Mother    • Alzheimer's disease Father    • Lung cancer Father    • Breast cancer Sister    • Macular degeneration Sister    • Malig Hyperthermia Neg Hx        Social History     Socioeconomic History   • Marital status: Single     Spouse name: Not on file   • Number of children: Not on file   • Years of education: Not on file   • Highest education level: Not on file   Tobacco Use   • Smoking status: Never Smoker   • Smokeless tobacco: Never Used   Substance and Sexual Activity   • Alcohol use: Yes     Frequency: 4 or more times a week     Drinks per session: 1 or 2     Comment: 2 drinks per day   • Drug use: No   • Sexual activity: Defer           The following portions of the patient's history were reviewed and updated as appropriate: problem list, allergies, current medications, past medical history, past family history, past social history and past surgical history.    Review of Systems   Respiratory:  "Negative for shortness of breath.    Cardiovascular: Negative for chest pain.       Immunization History   Administered Date(s) Administered   • Fluzone High Dose =>65 Years ([employer] ONLY) 10/01/2018, 12/15/2019   • Pneumococcal Conjugate 13-Valent (PCV13) 06/25/2019       Objective   Vitals:    12/30/19 1007   BP: 128/86   Weight: 97 kg (213 lb 12.8 oz)   Height: 180.3 cm (71\")     Body mass index is 29.82 kg/m².  Physical Exam   Constitutional: He appears well-developed and well-nourished.   HENT:   Head: Normocephalic and atraumatic.   Cardiovascular: Normal rate, regular rhythm, S1 normal, S2 normal and normal heart sounds.   Pulmonary/Chest: Effort normal and breath sounds normal.   Abdominal: Soft. He exhibits no mass. There is no tenderness.   Neurological: He is alert.   Skin: Skin is warm.   Psychiatric: He has a normal mood and affect.   Vitals reviewed.      Procedures    Assessment/Plan   Best was seen today for hyperlipidemia.    Diagnoses and all orders for this visit:    Hypothyroidism, unspecified type  -     TSH; Future    Essential hypertension  -     Comprehensive Metabolic Panel; Future    Pain of upper abdomen    Hyperlipidemia, unspecified hyperlipidemia type  -     Lipid Panel With / Chol / HDL Ratio; Future  -     atorvastatin (LIPITOR) 20 MG tablet; Take 1 tablet by mouth Daily.    Need for hepatitis C screening test  -     Hepatitis C Antibody; Future             Recc hep A at drug store. Reviewed labs. TSH is at goal. BP is okay. He is to have a repeat EGD and cscope in 3 years. Offered to do a GES and a Ct of his abdomen; he will discuss with Dr. Mills next month.    Return in about 6 months (around 6/30/2020) for Medicare Wellness.  "

## 2020-01-21 ENCOUNTER — OFFICE VISIT (OUTPATIENT)
Dept: GASTROENTEROLOGY | Facility: CLINIC | Age: 70
End: 2020-01-21

## 2020-01-21 VITALS
TEMPERATURE: 97.8 F | BODY MASS INDEX: 30.06 KG/M2 | SYSTOLIC BLOOD PRESSURE: 126 MMHG | DIASTOLIC BLOOD PRESSURE: 84 MMHG | WEIGHT: 214.7 LBS | HEIGHT: 71 IN

## 2020-01-21 DIAGNOSIS — K22.70 BARRETT'S ESOPHAGUS WITHOUT DYSPLASIA: ICD-10-CM

## 2020-01-21 DIAGNOSIS — R10.10 PAIN OF UPPER ABDOMEN: ICD-10-CM

## 2020-01-21 DIAGNOSIS — R10.12 LUQ PAIN: Primary | ICD-10-CM

## 2020-01-21 DIAGNOSIS — K63.5 POLYP OF COLON, UNSPECIFIED PART OF COLON, UNSPECIFIED TYPE: ICD-10-CM

## 2020-01-21 PROCEDURE — 99213 OFFICE O/P EST LOW 20 MIN: CPT | Performed by: INTERNAL MEDICINE

## 2020-01-21 NOTE — PROGRESS NOTES
Chief Complaint   Patient presents with   • Follow-up   • Abdominal Pain     discomfort       Best Adam is a  69 y.o. male here for a follow up visit for abd pain, non formed stools    Best is following up for a touch of abdominal pain, mushy and loose stools.  He likely has a component of irritable bowel syndrome.  He is tried FD guard with good relief of his symptoms.  The probiotic has not helped.  He is on omeprazole 20 mg twice daily for Mosley's esophagus and he denies GERD.  He has never tried fiber therapy.  He has no alarm symptoms such as rectal bleeding or weight loss.  He is up-to-date on EGD and colonoscopy.    He denies postprandial nausea, vomiting or bloating.  His primary care physician has asked him to discuss gastric emptying study with me      Past Medical History:   Diagnosis Date   • Anxiety    • Arthritis    • Awareness under anesthesia     DURING COLONSCOPY   • Epigastric pain     FOR A COUPLE OF YEARS   • History of colon polyps    • History of prostate cancer 2011   • History of syncope 2006   • Hyperlipidemia    • Hypertension    • Hypothyroidism        Past Surgical History:   Procedure Laterality Date   • COLONOSCOPY  2012    polyps per pt   • COLONOSCOPY N/A 7/30/2019    tics, NBIH, TA x 2, HP x 1   • ENDOSCOPY N/A 7/30/2019    Z line irregular, 39-41 cm from incisors, Chronic H pylori gastritis, Mosley's esophagus   • INGUINAL HERNIA REPAIR Right 2013   • PROSTATECTOMY  2011       Scheduled Meds:    Continuous Infusions:  No current facility-administered medications for this visit.     PRN Meds:.    No Known Allergies    Social History     Socioeconomic History   • Marital status: Single     Spouse name: Not on file   • Number of children: Not on file   • Years of education: Not on file   • Highest education level: Not on file   Tobacco Use   • Smoking status: Never Smoker   • Smokeless tobacco: Never Used   Substance and Sexual Activity   • Alcohol use: Yes     Frequency: 4  or more times a week     Drinks per session: 1 or 2     Comment: 2 drinks per day   • Drug use: No   • Sexual activity: Defer       Family History   Problem Relation Age of Onset   • Neuropathy Mother    • Alzheimer's disease Father    • Lung cancer Father    • Breast cancer Sister    • Macular degeneration Sister    • Malig Hyperthermia Neg Hx        Review of Systems   Gastrointestinal: Positive for abdominal pain and diarrhea. Negative for abdominal distention.   All other systems reviewed and are negative.      Vitals:    01/21/20 1317   BP: 126/84   Temp: 97.8 °F (36.6 °C)       Physical Exam   Constitutional: He is oriented to person, place, and time. He appears well-developed and well-nourished.   HENT:   Head: Normocephalic and atraumatic.   Eyes: Conjunctivae and EOM are normal.   Neck: Normal range of motion. No tracheal deviation present.   Cardiovascular: Normal rate and regular rhythm.   Pulmonary/Chest: Effort normal and breath sounds normal. No respiratory distress.   Abdominal: Soft. Bowel sounds are normal. He exhibits no distension and no mass. There is no tenderness. There is no rebound and no guarding.   Musculoskeletal: Normal range of motion.   Neurological: He is alert and oriented to person, place, and time.   Skin: Skin is warm and dry.   Psychiatric: He has a normal mood and affect. Judgment normal.   Nursing note and vitals reviewed.      No radiology results for the last 7 days      Problem list    Irritable bowel syndrome with diarrhea  Nonulcer dyspepsia  Mosley's esophagus      Assessment/Plan    Hold probiotic  Decrease omeprazole to 20 every morning  No indication for gastric emptying study as he denies postprandial bloating, there is no nausea or vomiting, he has no diabetes and takes no opioids or anticholinergic medication, and his thyroid studies recently are normal on low-dose Synthroid    Start fiber 3 g in the morning and 3 in the evening he may increase in 1 month to try to  bulk up the stools  No imaging or blood testing needed today  He is not due for EGD or colonoscopy at this time

## 2020-04-29 DIAGNOSIS — F41.9 ANXIETY: ICD-10-CM

## 2020-04-29 RX ORDER — ALPRAZOLAM 0.5 MG/1
TABLET ORAL
Qty: 15 TABLET | Refills: 1 | Status: SHIPPED | OUTPATIENT
Start: 2020-04-29 | End: 2021-04-06

## 2020-05-22 DIAGNOSIS — E03.9 HYPOTHYROIDISM, UNSPECIFIED TYPE: ICD-10-CM

## 2020-05-22 DIAGNOSIS — I10 ESSENTIAL HYPERTENSION: ICD-10-CM

## 2020-05-26 RX ORDER — LOSARTAN POTASSIUM 100 MG/1
50 TABLET ORAL DAILY
Qty: 30 TABLET | Refills: 5 | Status: SHIPPED | OUTPATIENT
Start: 2020-05-26 | End: 2021-01-19 | Stop reason: SDUPTHER

## 2020-05-26 RX ORDER — LEVOTHYROXINE SODIUM 112 UG/1
TABLET ORAL
Qty: 90 TABLET | Refills: 0 | Status: SHIPPED | OUTPATIENT
Start: 2020-05-26 | End: 2020-08-24

## 2020-06-24 ENCOUNTER — RESULTS ENCOUNTER (OUTPATIENT)
Dept: INTERNAL MEDICINE | Facility: CLINIC | Age: 70
End: 2020-06-24

## 2020-06-24 DIAGNOSIS — E78.5 HYPERLIPIDEMIA, UNSPECIFIED HYPERLIPIDEMIA TYPE: ICD-10-CM

## 2020-06-24 DIAGNOSIS — E03.9 HYPOTHYROIDISM, UNSPECIFIED TYPE: ICD-10-CM

## 2020-06-24 DIAGNOSIS — I10 ESSENTIAL HYPERTENSION: ICD-10-CM

## 2020-06-24 DIAGNOSIS — Z11.59 NEED FOR HEPATITIS C SCREENING TEST: ICD-10-CM

## 2020-06-24 LAB
ALBUMIN SERPL-MCNC: 4.7 G/DL (ref 3.5–5.2)
ALBUMIN/GLOB SERPL: 1.9 G/DL
ALP SERPL-CCNC: 71 U/L (ref 39–117)
ALT SERPL-CCNC: 34 U/L (ref 1–41)
AST SERPL-CCNC: 24 U/L (ref 1–40)
BILIRUB SERPL-MCNC: 1 MG/DL (ref 0.2–1.2)
BUN SERPL-MCNC: 23 MG/DL (ref 8–23)
BUN/CREAT SERPL: 22.8 (ref 7–25)
CALCIUM SERPL-MCNC: 9.6 MG/DL (ref 8.6–10.5)
CHLORIDE SERPL-SCNC: 105 MMOL/L (ref 98–107)
CHOLEST SERPL-MCNC: 158 MG/DL (ref 0–200)
CHOLEST/HDLC SERPL: 2.72 {RATIO}
CO2 SERPL-SCNC: 26 MMOL/L (ref 22–29)
CREAT SERPL-MCNC: 1.01 MG/DL (ref 0.76–1.27)
GLOBULIN SER CALC-MCNC: 2.5 GM/DL
GLUCOSE SERPL-MCNC: 100 MG/DL (ref 65–99)
HCV AB S/CO SERPL IA: 0.1 S/CO RATIO (ref 0–0.9)
HDLC SERPL-MCNC: 58 MG/DL (ref 40–60)
LDLC SERPL CALC-MCNC: 84 MG/DL (ref 0–100)
POTASSIUM SERPL-SCNC: 4.6 MMOL/L (ref 3.5–5.2)
PROT SERPL-MCNC: 7.2 G/DL (ref 6–8.5)
SODIUM SERPL-SCNC: 141 MMOL/L (ref 136–145)
TRIGL SERPL-MCNC: 78 MG/DL (ref 0–150)
TSH SERPL DL<=0.005 MIU/L-ACNC: 0.46 UIU/ML (ref 0.27–4.2)
VLDLC SERPL CALC-MCNC: 15.6 MG/DL

## 2020-06-30 ENCOUNTER — OFFICE VISIT (OUTPATIENT)
Dept: INTERNAL MEDICINE | Facility: CLINIC | Age: 70
End: 2020-06-30

## 2020-06-30 VITALS
BODY MASS INDEX: 29.82 KG/M2 | TEMPERATURE: 98.3 F | SYSTOLIC BLOOD PRESSURE: 132 MMHG | DIASTOLIC BLOOD PRESSURE: 82 MMHG | WEIGHT: 213 LBS | HEIGHT: 71 IN

## 2020-06-30 DIAGNOSIS — E78.5 HYPERLIPIDEMIA, UNSPECIFIED HYPERLIPIDEMIA TYPE: ICD-10-CM

## 2020-06-30 DIAGNOSIS — I10 ESSENTIAL HYPERTENSION: ICD-10-CM

## 2020-06-30 DIAGNOSIS — Z00.00 MEDICARE ANNUAL WELLNESS VISIT, SUBSEQUENT: Primary | ICD-10-CM

## 2020-06-30 DIAGNOSIS — E03.9 HYPOTHYROIDISM, UNSPECIFIED TYPE: ICD-10-CM

## 2020-06-30 PROCEDURE — G0009 ADMIN PNEUMOCOCCAL VACCINE: HCPCS | Performed by: INTERNAL MEDICINE

## 2020-06-30 PROCEDURE — G0439 PPPS, SUBSEQ VISIT: HCPCS | Performed by: INTERNAL MEDICINE

## 2020-06-30 PROCEDURE — 99213 OFFICE O/P EST LOW 20 MIN: CPT | Performed by: INTERNAL MEDICINE

## 2020-06-30 PROCEDURE — 90732 PPSV23 VACC 2 YRS+ SUBQ/IM: CPT | Performed by: INTERNAL MEDICINE

## 2020-06-30 NOTE — PROGRESS NOTES
The ABCs of the Annual Wellness Visit  Initial Medicare Wellness Visit    Chief Complaint   Patient presents with   • Hyperlipidemia   • Hypothyroidism   • Medicare Wellness-subsequent       Subjective   History of Present Illness:  Best Adam is a 69 y.o. male who presents for an Initial Medicare Wellness Visit.  His abdominal pain is better with FDguard. He denies chest pain or dyspnea. He is not checking his BP. He sees Dr. Salazar twice per year and his PSA is undetectable. He is not depressed. He has the blues but does not feel like he needs to be on an SSRI. He denies SI.   HEALTH RISK ASSESSMENT    Recent Hospitalizations:  No hospitalization(s) within the last year.    Current Medical Providers:  Patient Care Team:  Tim Kim MD as PCP - General (Internal Medicine)  Joseph Richardson MD as PCP - Claims Attributed    Smoking Status:  Social History     Tobacco Use   Smoking Status Never Smoker   Smokeless Tobacco Never Used       Alcohol Consumption:  Social History     Substance and Sexual Activity   Alcohol Use Yes   • Frequency: 4 or more times a week   • Drinks per session: 1 or 2    Comment: 2 drinks per day       Depression Screen:   PHQ-2/PHQ-9 Depression Screening 6/30/2020   Little interest or pleasure in doing things 1   Feeling down, depressed, or hopeless 1   Trouble falling or staying asleep, or sleeping too much 1   Feeling tired or having little energy 1   Poor appetite or overeating 1   Feeling bad about yourself - or that you are a failure or have let yourself or your family down 0   Trouble concentrating on things, such as reading the newspaper or watching television 0   Moving or speaking so slowly that other people could have noticed. Or the opposite - being so fidgety or restless that you have been moving around a lot more than usual 0   Thoughts that you would be better off dead, or of hurting yourself in some way 0   Total Score 5   If you checked off any problems, how  difficult have these problems made it for you to do your work, take care of things at home, or get along with other people? Not difficult at all       Fall Risk Screen:  DERRICKADI Fall Risk Assessment was completed, and patient is at LOW risk for falls.Assessment completed on:6/30/2020    Health Habits and Functional and Cognitive Screening:  Functional & Cognitive Status 6/30/2020   Do you have difficulty preparing food and eating? No   Do you have difficulty bathing yourself, getting dressed or grooming yourself? No   Do you have difficulty using the toilet? No   Do you have difficulty moving around from place to place? No   Do you have trouble with steps or getting out of a bed or a chair? No   Current Diet Unhealthy Diet   Dental Exam Not up to date   Eye Exam Up to date   Exercise (times per week) 0 times per week   Current Exercise Activities Include None   Do you need help using the phone?  No   Are you deaf or do you have serious difficulty hearing?  No   Do you need help with transportation? No   Do you need help shopping? No   Do you need help preparing meals?  No   Do you need help with housework?  No   Do you need help with laundry? No   Do you need help taking your medications? No   Do you need help managing money? No   Do you ever drive or ride in a car without wearing a seat belt? No   Have you felt unusual stress, anger or loneliness in the last month? Yes   Who do you live with? Alone   If you need help, do you have trouble finding someone available to you? No   Have you been bothered in the last four weeks by sexual problems? No   Do you have difficulty concentrating, remembering or making decisions? No         Does the patient have evidence of cognitive impairment? No    Asprin use counseling:Does not need ASA (and currently is not on it)    Age-appropriate Screening Schedule:  Refer to the list below for future screening recommendations based on patient's age, sex and/or medical conditions. Orders  for these recommended tests are listed in the plan section. The patient has been provided with a written plan.    Health Maintenance   Topic Date Due   • TDAP/TD VACCINES (1 - Tdap) 11/03/1961   • ZOSTER VACCINE (1 of 2) 11/03/2002   • INFLUENZA VACCINE  08/01/2020   • LIPID PANEL  06/23/2021   • COLONOSCOPY  07/30/2022          The following portions of the patient's history were reviewed and updated as appropriate: allergies, current medications, past family history, past medical history, past social history, past surgical history and problem list.    Outpatient Medications Prior to Visit   Medication Sig Dispense Refill   • ALPRAZolam (XANAX) 0.5 MG tablet TAKE 1/2 TABLET BY MOUTH  DAILY AS NEEDED FOR ANXIETY 15 tablet 1   • atorvastatin (LIPITOR) 20 MG tablet Take 1 tablet by mouth Daily. 90 tablet 1   • cholecalciferol (VITAMIN D3) 1000 units tablet Take 1,000 Units by mouth Daily.     • EUTHYROX 112 MCG tablet Take 1 tablet by mouth once daily 90 tablet 0   • losartan (COZAAR) 100 MG tablet Take 0.5 tablets by mouth Daily. 1/2 tab po daily 30 tablet 5   • Multiple Vitamins-Minerals (OCUVITE ADULT 50+ PO) Take 1 tablet by mouth Daily.     • Omega-3 Fatty Acids (FISH OIL MAXIMUM STRENGTH) 1200 MG capsule delayed-release Take 1 tablet by mouth Daily.     • omeprazole (PRILOSEC) 20 MG capsule Take 2 capsules by mouth Daily. 30 capsule 6   • Unable to find Take 2 each by mouth Daily. FD Guard     • Probiotic Product (RISAQUAD-2) capsule capsule Take 1 capsule by mouth Daily.       No facility-administered medications prior to visit.        Patient Active Problem List   Diagnosis   • Hyperlipidemia   • Hypertension   • Hypothyroidism   • Anxiety   • Prostate CA (CMS/HCC)   • Post-procedural erectile dysfunction   • RBBB   • Pain of upper abdomen   • Tubular adenoma   • Medicare annual wellness visit, subsequent       Advanced Care Planning:  ACP discussion was held with the patient during this visit. Patient has an  "advance directive (not in EMR), copy requested.    Review of Systems   Respiratory: Negative for shortness of breath.    Cardiovascular: Negative for chest pain.       Compared to one year ago, the patient feels his physical health is better.  Compared to one year ago, the patient feels his mental health is worse.    Reviewed chart for potential of high risk medication in the elderly: yes  Reviewed chart for potential of harmful drug interactions in the elderly:yes    Objective         Vitals:    06/30/20 1333   BP: 132/82   Temp: 98.3 °F (36.8 °C)   Weight: 96.6 kg (213 lb)   Height: 180.3 cm (71\")       Body mass index is 29.71 kg/m².  Discussed the patient's BMI with him. The BMI is in the acceptable range.    Physical Exam   Constitutional: He appears well-developed and well-nourished.   Neck: Carotid bruit is not present.   Cardiovascular: Normal rate, regular rhythm and normal heart sounds.   Pulmonary/Chest: Effort normal and breath sounds normal.   Nursing note and vitals reviewed.      Lab Results   Component Value Date     (H) 06/23/2020    CHLPL 158 06/23/2020    TRIG 78 06/23/2020    HDL 58 06/23/2020    LDL 84 06/23/2020    VLDL 15.6 06/23/2020        Assessment/Plan   Medicare Risks and Personalized Health Plan  CMS Preventative Services Quick Reference  Advance Directive Discussion    The above risks/problems have been discussed with the patient.  Pertinent information has been shared with the patient in the After Visit Summary.  Follow up plans and orders are seen below in the Assessment/Plan Section.    Diagnoses and all orders for this visit:    1. Medicare annual wellness visit, subsequent (Primary)    2. Hypothyroidism, unspecified type    3. Essential hypertension    4. Hyperlipidemia, unspecified hyperlipidemia type    Other orders  -     Pneumococcal Polysaccharide Vaccine 23-Valent Greater Than or Equal To 3yo Subcutaneous / IM      Follow Up:  Return in about 6 months (around " 12/30/2020), or 30 minutes.     An After Visit Summary and PPPS were given to the patient.         Labs reviewed. Pneumovax 23 today. Recc Tdap, Shingrix and hep A shots at drug store. He does not want to start an SSRI. Discussed exercising 150 minutes per week. BP is good. TSH is good. LDL is good as well. He reports a negative HIV three years ago.

## 2020-08-24 DIAGNOSIS — E03.9 HYPOTHYROIDISM, UNSPECIFIED TYPE: ICD-10-CM

## 2020-08-24 RX ORDER — LEVOTHYROXINE SODIUM 112 UG/1
TABLET ORAL
Qty: 90 TABLET | Refills: 1 | Status: SHIPPED | OUTPATIENT
Start: 2020-08-24 | End: 2020-11-18 | Stop reason: SDUPTHER

## 2020-09-01 ENCOUNTER — OFFICE VISIT (OUTPATIENT)
Dept: GASTROENTEROLOGY | Facility: CLINIC | Age: 70
End: 2020-09-01

## 2020-09-01 VITALS — WEIGHT: 214 LBS | BODY MASS INDEX: 29.96 KG/M2 | HEIGHT: 71 IN | TEMPERATURE: 97.2 F

## 2020-09-01 DIAGNOSIS — K30 NONULCER DYSPEPSIA: ICD-10-CM

## 2020-09-01 DIAGNOSIS — K22.70 BARRETT'S ESOPHAGUS WITHOUT DYSPLASIA: ICD-10-CM

## 2020-09-01 DIAGNOSIS — K58.0 IRRITABLE BOWEL SYNDROME WITH DIARRHEA: Primary | ICD-10-CM

## 2020-09-01 PROCEDURE — 99213 OFFICE O/P EST LOW 20 MIN: CPT | Performed by: NURSE PRACTITIONER

## 2020-09-01 NOTE — PROGRESS NOTES
Chief Complaint   Patient presents with   • Heartburn   • Bloating, gas, loose stools     HPI    Best Adam is a  69 y.o. male here for a follow up visit for IBS-D and nonulcer dyspepsia.  This patient follows with Dr. Mills, new to me.  Seen in January of this year for complaints of loose stools and abdominal pain.  He tried FD guard with good relief of symptoms.  No relief with probiotics.  He is on daily PPI therapy for Mosley's esophagus.  Patient started on high fiber supplements and decreased omeprazole to 20 mg every morning on that office visit.    On visit today he admits he never started high-fiber supplementation.  He still struggling with postprandial bloating, loose consistency stools approximately 2 a day and increased gas. No weight loss, abdominal pain, or rectal bleeding.  No nocturnal symptoms.    He continues on Prilosec 20 mg once daily reports adequate control of GERD symptoms.  No nausea, vomiting, or dysphagia.  He continues on FD guard which has helped him tremendously.  He takes FD guard on average 1 tablet twice daily.    Data reviewed    EGD 7/30/2019 with irregular Z line otherwise normal.  Colonoscopy 7/30/2019 with polyps, diverticulosis, nonbleeding internal hemorrhoids, normal ileum.  Recall 3 years.  Pathology-- + H. pylori, Mosley's esophagus, precancerous polyps.     Patient was treated with amoxicillin and Biaxin.  Follow-up breath test was negative for H. pylori dated 9/30/2019.     Recall EGD and colonoscopy 3 years.    Past Medical History:   Diagnosis Date   • Anxiety    • Arthritis    • Awareness under anesthesia     DURING COLONSCOPY   • Epigastric pain     FOR A COUPLE OF YEARS   • History of colon polyps    • History of prostate cancer 2011   • History of syncope 2006   • Hyperlipidemia    • Hypertension    • Hypothyroidism        Past Surgical History:   Procedure Laterality Date   • COLONOSCOPY  2012    polyps per pt   • COLONOSCOPY N/A 7/30/2019    tics, NBIH,  TA x 2, HP x 1   • ENDOSCOPY N/A 7/30/2019    Z line irregular, 39-41 cm from incisors, Chronic H pylori gastritis, Mosley's esophagus   • INGUINAL HERNIA REPAIR Right 2013   • PROSTATECTOMY  2011       Scheduled Meds:  Outpatient Encounter Medications as of 9/1/2020   Medication Sig Dispense Refill   • ALPRAZolam (XANAX) 0.5 MG tablet TAKE 1/2 TABLET BY MOUTH  DAILY AS NEEDED FOR ANXIETY 15 tablet 1   • atorvastatin (LIPITOR) 20 MG tablet Take 1 tablet by mouth Daily. 90 tablet 1   • cholecalciferol (VITAMIN D3) 1000 units tablet Take 1,000 Units by mouth Daily.     • levothyroxine (SYNTHROID, LEVOTHROID) 112 MCG tablet Take 1 tablet by mouth once daily 90 tablet 1   • losartan (COZAAR) 100 MG tablet Take 0.5 tablets by mouth Daily. 1/2 tab po daily 30 tablet 5   • Multiple Vitamins-Minerals (OCUVITE ADULT 50+ PO) Take 1 tablet by mouth Daily.     • Omega-3 Fatty Acids (FISH OIL MAXIMUM STRENGTH) 1200 MG capsule delayed-release Take 1 tablet by mouth Daily.     • omeprazole (PRILOSEC) 20 MG capsule Take 2 capsules by mouth Daily. 30 capsule 6   • Unable to find Take 2 each by mouth Daily. FD Guard       No facility-administered encounter medications on file as of 9/1/2020.        Continuous Infusions:  No current facility-administered medications for this visit.     PRN Meds:.    No Known Allergies    Social History     Socioeconomic History   • Marital status: Single     Spouse name: Not on file   • Number of children: Not on file   • Years of education: Not on file   • Highest education level: Not on file   Tobacco Use   • Smoking status: Never Smoker   • Smokeless tobacco: Never Used   Substance and Sexual Activity   • Alcohol use: Yes     Frequency: 4 or more times a week     Drinks per session: 1 or 2     Comment: 2 drinks per day   • Drug use: No   • Sexual activity: Defer       Family History   Problem Relation Age of Onset   • Neuropathy Mother    • Alzheimer's disease Father    • Lung cancer Father    •  Breast cancer Sister    • Macular degeneration Sister    • Malig Hyperthermia Neg Hx        Review of Systems   Constitutional: Negative for activity change, appetite change, fatigue, fever and unexpected weight change.   HENT: Negative for trouble swallowing.    Respiratory: Negative for apnea, cough, choking, chest tightness, shortness of breath and wheezing.    Cardiovascular: Negative for chest pain, palpitations and leg swelling.   Gastrointestinal: Negative for abdominal distention, abdominal pain, anal bleeding, blood in stool, constipation, diarrhea, nausea, rectal pain and vomiting.        + Blood, gas, and loose stools       Vitals:    09/01/20 1111   Temp: 97.2 °F (36.2 °C)       Physical Exam   Constitutional: He is oriented to person, place, and time. He appears well-developed and well-nourished.   Eyes: Pupils are equal, round, and reactive to light.   Cardiovascular: Normal rate, regular rhythm and normal heart sounds.   Pulmonary/Chest: Effort normal and breath sounds normal. No respiratory distress. He has no wheezes.   Abdominal: Soft. Bowel sounds are normal. He exhibits no distension and no mass. There is no tenderness. There is no guarding. No hernia.   Musculoskeletal: Normal range of motion.   Neurological: He is alert and oriented to person, place, and time.   Skin: Skin is warm and dry. Capillary refill takes less than 2 seconds.   Psychiatric: He has a normal mood and affect. His behavior is normal.       No radiology results for the last 7 days    There are no diagnoses linked to this encounter.    Problem list    Irritable bowel syndrome with diarrhea  Nonulcer dyspepsia  Mosley's esophagus    Assessment/plan    Arrange breath testing to rule out small intestinal bacterial overgrowth given complaints of postprandial bloat, gas, and loose stools.  Start Metamucil 1 tablespoon daily can increase to 2's tablespoons if needed to act as a bulking agent.  Continue FD guard.  Continue  omeprazole 20 mg once daily to manage nonulcer dyspepsia and Mosley's esophagus.  EGD and colonoscopy for screening purposes 2 years.  Further recommendations pending breath testing.

## 2020-09-08 DIAGNOSIS — R10.12 LUQ PAIN: ICD-10-CM

## 2020-09-10 ENCOUNTER — TELEPHONE (OUTPATIENT)
Dept: GASTROENTEROLOGY | Facility: CLINIC | Age: 70
End: 2020-09-10

## 2020-09-10 RX ORDER — OMEPRAZOLE 20 MG/1
CAPSULE, DELAYED RELEASE ORAL
Qty: 60 CAPSULE | Refills: 10 | Status: SHIPPED | OUTPATIENT
Start: 2020-09-10 | End: 2020-12-07 | Stop reason: SDUPTHER

## 2020-09-10 NOTE — TELEPHONE ENCOUNTER
----- Message from GEETHA Katz sent at 9/1/2020 11:49 AM EDT -----  Please mail SIBO breath testing kit to the patient's home.

## 2020-11-13 ENCOUNTER — TELEPHONE (OUTPATIENT)
Dept: INTERNAL MEDICINE | Facility: CLINIC | Age: 70
End: 2020-11-13

## 2020-11-13 DIAGNOSIS — E78.5 HYPERLIPIDEMIA, UNSPECIFIED HYPERLIPIDEMIA TYPE: ICD-10-CM

## 2020-11-13 RX ORDER — ATORVASTATIN CALCIUM 20 MG/1
20 TABLET, FILM COATED ORAL DAILY
Qty: 90 TABLET | Refills: 0 | Status: SHIPPED | OUTPATIENT
Start: 2020-11-13 | End: 2021-02-12 | Stop reason: SDUPTHER

## 2020-11-13 RX ORDER — ATORVASTATIN CALCIUM 20 MG/1
20 TABLET, FILM COATED ORAL DAILY
Qty: 90 TABLET | Refills: 0 | Status: SHIPPED | OUTPATIENT
Start: 2020-11-13 | End: 2020-11-13 | Stop reason: SDUPTHER

## 2020-11-13 NOTE — TELEPHONE ENCOUNTER
PATIENT IS NEEDING A REFILL OF:    atorvastatin (LIPITOR) 20 MG tablet    HE HAS 2 DAYS SUPPLY.    HE WOULD LIKE FOR THE MARIANA GONZALEZ MD TO GET THE MEDICATION.    BEST PH#: [phone]

## 2020-11-18 DIAGNOSIS — E03.9 HYPOTHYROIDISM, UNSPECIFIED TYPE: ICD-10-CM

## 2020-11-18 RX ORDER — LEVOTHYROXINE SODIUM 112 UG/1
112 TABLET ORAL DAILY
Qty: 90 TABLET | Refills: 1 | Status: SHIPPED | OUTPATIENT
Start: 2020-11-18 | End: 2021-02-12 | Stop reason: SDUPTHER

## 2020-11-18 NOTE — TELEPHONE ENCOUNTER
Caller: Best Adam    Relationship: Self    Best call back number: [phone]     Medication needed:   Requested Prescriptions     Pending Prescriptions Disp Refills   • levothyroxine (SYNTHROID, LEVOTHROID) 112 MCG tablet 90 tablet 1     Sig: Take 1 tablet by mouth Daily.       When do you need the refill by: 11-23-20     What details did the patient provide when requesting the medication: PATIENT HAS 5 DAYS LEFT BUT WANTED TO CALL EARLY DUE TO THE UPCOMING HOLIDAY     Does the patient have less than a 3 day supply:  [] Yes  [x] No    What is the patient's preferred pharmacy: [id number] - [place] - [address] [facility] [phone] [facility] [phone] FX     PATIENT STATED HE IS IN [place] UNTIL MARCH 2021 AND IS REQUESTING A 90 DAY SUPPLY

## 2020-12-03 ENCOUNTER — TELEPHONE (OUTPATIENT)
Dept: GASTROENTEROLOGY | Facility: CLINIC | Age: 70
End: 2020-12-03

## 2020-12-03 DIAGNOSIS — R10.12 LUQ PAIN: ICD-10-CM

## 2020-12-03 NOTE — TELEPHONE ENCOUNTER
----- Message from Jeanine Lux sent at 12/3/2020  2:36 PM EST -----  Contact: [phone]  Patient is requesting a call back from RN

## 2020-12-07 RX ORDER — OMEPRAZOLE 20 MG/1
40 CAPSULE, DELAYED RELEASE ORAL DAILY
Qty: 60 CAPSULE | Refills: 10 | Status: SHIPPED | OUTPATIENT
Start: 2020-12-07 | End: 2021-04-12 | Stop reason: SDUPTHER

## 2020-12-07 NOTE — TELEPHONE ENCOUNTER
Emelia Feldman RegSched Rep  [facility]   Phone Number: [phone]              PT is calling again hes is out of medication.     PT is calling due to [place] and he needs prescription for omeprazole (priLOSEC) 20 MG capsule transferred to new pharmacy, Please send refill           Pharmacy:  [id number] - [place] - [address] [place] [address] - [phone] PH - [phone] FX   Phone:  [phone]   Fax:  [phone]   Address:  [address] [place], [place] [address] [address]   Pharmacy Comments: --

## 2021-01-19 DIAGNOSIS — I10 ESSENTIAL HYPERTENSION: ICD-10-CM

## 2021-01-19 RX ORDER — LOSARTAN POTASSIUM 100 MG/1
50 TABLET ORAL DAILY
Qty: 30 TABLET | Refills: 5 | Status: SHIPPED | OUTPATIENT
Start: 2021-01-19 | End: 2021-04-06 | Stop reason: SDUPTHER

## 2021-01-19 NOTE — TELEPHONE ENCOUNTER
Caller: Best Adam    Relationship: Self    Best call back number: [phone]     Medication needed:   Requested Prescriptions     Pending Prescriptions Disp Refills   • losartan (COZAAR) 100 MG tablet 30 tablet 5     Sig: Take 0.5 tablets by mouth Daily. 1/2 tab po daily       When do you need the refill by: ASAP     What details did the patient provide when requesting the medication: PATIENT STATES HE HAS ATLEAST ENOUGH TO LAST HIM FOR ABOUT 3 DAYS.    Does the patient have less than a 3 day supply:  [] Yes  [x] No    What is the patient's preferred pharmacy: [facility] - [place] - [address] [facility] [phone] [facility] [phone] FX

## 2021-02-12 DIAGNOSIS — E03.9 HYPOTHYROIDISM, UNSPECIFIED TYPE: ICD-10-CM

## 2021-02-12 DIAGNOSIS — E78.5 HYPERLIPIDEMIA, UNSPECIFIED HYPERLIPIDEMIA TYPE: ICD-10-CM

## 2021-02-12 RX ORDER — LEVOTHYROXINE SODIUM 112 UG/1
112 TABLET ORAL DAILY
Qty: 90 TABLET | Refills: 1 | Status: SHIPPED | OUTPATIENT
Start: 2021-02-12 | End: 2021-04-06 | Stop reason: SDUPTHER

## 2021-02-12 RX ORDER — ATORVASTATIN CALCIUM 20 MG/1
20 TABLET, FILM COATED ORAL DAILY
Qty: 90 TABLET | Refills: 0 | Status: SHIPPED | OUTPATIENT
Start: 2021-02-12 | End: 2021-04-06

## 2021-02-12 NOTE — TELEPHONE ENCOUNTER
Caller: Best Adam    Relationship: Self    Best call back number: [phone]    Medication needed:   Requested Prescriptions     Pending Prescriptions Disp Refills   • atorvastatin (LIPITOR) 20 MG tablet 90 tablet 0     Sig: Take 1 tablet by mouth Daily.   • levothyroxine (SYNTHROID, LEVOTHROID) 112 MCG tablet 90 tablet 1     Sig: Take 1 tablet by mouth Daily.       When do you need the refill by: ASAP    What details did the patient provide when requesting the medication: HAS FIVE DAYS OF MEDS LEFT, WOULD LIKE CALLED IN TODAY AS THE WEATHER IS BAD AND WILL NEED AMPLE TIME TO DRIVE 20 MILES TO THE PHARMACY.    Does the patient have less than a 3 day supply:  [] Yes  [x] No    What is the patient's preferred pharmacy: [facility] - [place] - [address] [facility] [phone] [facility] [phone] FX

## 2021-03-16 ENCOUNTER — BULK ORDERING (OUTPATIENT)
Dept: CASE MANAGEMENT | Facility: OTHER | Age: 71
End: 2021-03-16

## 2021-03-16 DIAGNOSIS — Z23 IMMUNIZATION DUE: ICD-10-CM

## 2021-04-06 ENCOUNTER — OFFICE VISIT (OUTPATIENT)
Dept: INTERNAL MEDICINE | Facility: CLINIC | Age: 71
End: 2021-04-06

## 2021-04-06 VITALS
TEMPERATURE: 97.8 F | BODY MASS INDEX: 28.98 KG/M2 | SYSTOLIC BLOOD PRESSURE: 108 MMHG | DIASTOLIC BLOOD PRESSURE: 70 MMHG | HEIGHT: 71 IN | WEIGHT: 207 LBS

## 2021-04-06 DIAGNOSIS — E03.9 HYPOTHYROIDISM, UNSPECIFIED TYPE: ICD-10-CM

## 2021-04-06 DIAGNOSIS — E03.8 OTHER SPECIFIED HYPOTHYROIDISM: Chronic | ICD-10-CM

## 2021-04-06 DIAGNOSIS — E78.49 OTHER HYPERLIPIDEMIA: Primary | Chronic | ICD-10-CM

## 2021-04-06 DIAGNOSIS — I10 ESSENTIAL HYPERTENSION: Chronic | ICD-10-CM

## 2021-04-06 DIAGNOSIS — I73.00 RAYNAUD'S PHENOMENON WITHOUT GANGRENE: ICD-10-CM

## 2021-04-06 DIAGNOSIS — I72.4 BILATERAL POPLITEAL ARTERY ANEURYSM (HCC): ICD-10-CM

## 2021-04-06 PROCEDURE — 99214 OFFICE O/P EST MOD 30 MIN: CPT | Performed by: INTERNAL MEDICINE

## 2021-04-06 RX ORDER — LOSARTAN POTASSIUM 100 MG/1
50 TABLET ORAL DAILY
Qty: 30 TABLET | Refills: 5 | Status: SHIPPED | OUTPATIENT
Start: 2021-04-06 | End: 2021-05-17 | Stop reason: SDUPTHER

## 2021-04-06 RX ORDER — ESCITALOPRAM OXALATE 5 MG/1
5 TABLET ORAL DAILY
COMMUNITY
Start: 2021-03-19 | End: 2021-06-21 | Stop reason: SDUPTHER

## 2021-04-06 RX ORDER — LEVOTHYROXINE SODIUM 112 UG/1
112 TABLET ORAL DAILY
Qty: 90 TABLET | Refills: 1 | Status: SHIPPED | OUTPATIENT
Start: 2021-04-06 | End: 2021-04-08

## 2021-04-06 RX ORDER — ATORVASTATIN CALCIUM 40 MG/1
40 TABLET, FILM COATED ORAL DAILY
Qty: 30 TABLET | Refills: 4 | Status: SHIPPED | OUTPATIENT
Start: 2021-04-06 | End: 2021-07-08 | Stop reason: SDUPTHER

## 2021-04-06 NOTE — PROGRESS NOTES
Subjective        Chief Complaint   Patient presents with   • Hyperlipidemia           Best Adam is a 70 y.o. male who presents for    Patient Active Problem List   Diagnosis   • Other hyperlipidemia   • Essential hypertension   • Other specified hypothyroidism   • Anxiety   • Prostate CA (CMS/HCC)   • Post-procedural erectile dysfunction   • RBBB   • Pain of upper abdomen   • Tubular adenoma   • Bilateral popliteal artery aneurysm (CMS/HCC)   • Raynaud's phenomenon without gangrene       History of Present Illness     He developed some discoloration on three toes of his right foot on the bottom. It was never painful. He saw a podiatrist in [place]. He saw a vascular surgeon with a lower extremity USN. He was diagnosed with a bilateral popliteal aneurysms. He also had a normal abdominal aneurysm. He also told by his friends that he was anxious. He did a telemedicine visit with a psychiatrist in [place]. He was started on Lexapro and his anxiety is better. He has not been having any joint pain and swelling. He has a rare knee pain.  No Known Allergies    Current Outpatient Medications on File Prior to Visit   Medication Sig Dispense Refill   • cholecalciferol (VITAMIN D3) 1000 units tablet Take 1,000 Units by mouth Daily.     • escitalopram (LEXAPRO) 5 MG tablet Take 5 mg by mouth Daily.     • Multiple Vitamins-Minerals (OCUVITE ADULT 50+ PO) Take 1 tablet by mouth Daily.     • Omega-3 Fatty Acids (FISH OIL MAXIMUM STRENGTH) 1200 MG capsule delayed-release Take 1 tablet by mouth Daily.     • omeprazole (priLOSEC) 20 MG capsule Take 2 capsules by mouth Daily. 60 capsule 10   • Unable to find Take 2 each by mouth Daily. FD Guard     • [DISCONTINUED] ALPRAZolam (XANAX) 0.5 MG tablet TAKE 1/2 TABLET BY MOUTH  DAILY AS NEEDED FOR ANXIETY 15 tablet 1   • [DISCONTINUED] atorvastatin (LIPITOR) 20 MG tablet Take 1 tablet by mouth Daily. 90 tablet 0   • [DISCONTINUED] levothyroxine (SYNTHROID, LEVOTHROID) 112 MCG tablet Take 1  tablet by mouth Daily. 90 tablet 1   • [DISCONTINUED] losartan (COZAAR) 100 MG tablet Take 0.5 tablets by mouth Daily. 1/2 tab po daily 30 tablet 5     No current facility-administered medications on file prior to visit.       Past Medical History:   Diagnosis Date   • Arthritis    • Awareness under anesthesia     DURING COLONSCOPY   • Epigastric pain     FOR A COUPLE OF YEARS   • History of colon polyps    • History of prostate cancer 2011   • History of syncope 2006       Past Surgical History:   Procedure Laterality Date   • COLONOSCOPY  2012    polyps per pt   • COLONOSCOPY N/A 7/30/2019    tics, NBIH, TA x 2, HP x 1   • ENDOSCOPY N/A 7/30/2019    Z line irregular, 39-41 cm from incisors, Chronic H pylori gastritis, Mosley's esophagus   • INGUINAL HERNIA REPAIR Right 2013   • PROSTATECTOMY  2011       Family History   Problem Relation Age of Onset   • Neuropathy Mother    • Alzheimer's disease Father    • Lung cancer Father    • Breast cancer Sister    • Macular degeneration Sister    • Malig Hyperthermia Neg Hx        Social History     Socioeconomic History   • Marital status: Single     Spouse name: Not on file   • Number of children: Not on file   • Years of education: Not on file   • Highest education level: Not on file   Tobacco Use   • Smoking status: Never Smoker   • Smokeless tobacco: Never Used   Substance and Sexual Activity   • Alcohol use: Yes     Comment: 1 drink per day   • Drug use: No   • Sexual activity: Defer           The following portions of the patient's history were reviewed and updated as appropriate: problem list, allergies, current medications, past medical history, past family history, past social history and past surgical history.    Review of Systems    Immunization History   Administered Date(s) Administered   • COVID-19 (PFIZER) 02/05/2021, 03/15/2021   • Fluzone High Dose =>65 Years ([employer] ONLY) 10/01/2018, 12/15/2019   • Pneumococcal Conjugate 13-Valent (PCV13) 06/25/2019   •  "Pneumococcal Polysaccharide (PPSV23) 06/30/2020       Objective   Vitals:    04/06/21 1256   BP: 108/70   Temp: 97.8 °F (36.6 °C)   Weight: 93.9 kg (207 lb)   Height: 180.3 cm (71\")     Body mass index is 28.87 kg/m².  Physical Exam  Vitals reviewed.   Constitutional:       Appearance: He is well-developed.   HENT:      Head: Normocephalic and atraumatic.   Cardiovascular:      Rate and Rhythm: Normal rate and regular rhythm.      Heart sounds: Normal heart sounds, S1 normal and S2 normal.   Pulmonary:      Effort: Pulmonary effort is normal.      Breath sounds: Normal breath sounds.   Feet:      Comments: 2+ DP/PT in both feet.    Slight discoloration on the plantar aspect of the right second and third distal toe with healing ulceration  Skin:     General: Skin is warm.   Neurological:      Mental Status: He is alert.   Psychiatric:         Behavior: Behavior normal.         Procedures    Assessment/Plan   Diagnoses and all orders for this visit:    1. Other hyperlipidemia (Primary)  -     atorvastatin (Lipitor) 40 MG tablet; Take 1 tablet by mouth Daily.  Dispense: 30 tablet; Refill: 4  -     Lipid Panel With / Chol / HDL Ratio; Future    2. Essential hypertension  -     losartan (COZAAR) 100 MG tablet; Take 0.5 tablets by mouth Daily. 1/2 tab po daily  Dispense: 30 tablet; Refill: 5  -     Comprehensive Metabolic Panel; Future    3. Other specified hypothyroidism  -     TSH    4. Bilateral popliteal artery aneurysm (CMS/HCC)    5. Raynaud's phenomenon without gangrene    6. Hypothyroidism, unspecified type  -     levothyroxine (SYNTHROID, LEVOTHROID) 112 MCG tablet; Take 1 tablet by mouth Daily.  Dispense: 90 tablet; Refill: 1               He feels better with Lexapro. He has had two popliteal aneurysms diagnosed in [place]. I will have him f/u vascular in one year from his last USN. BP is good. Increase Lipitor. I do not think that he needs secondary work up for Raynauds at this point.  Recc Tdap and " Shingrix.  Return in about 3 months (around 7/6/2021) for Medicare Wellness, Lab Today, Lab Before FUP.

## 2021-04-07 LAB — TSH SERPL DL<=0.005 MIU/L-ACNC: 0.25 UIU/ML (ref 0.27–4.2)

## 2021-04-08 DIAGNOSIS — E03.8 OTHER SPECIFIED HYPOTHYROIDISM: Primary | ICD-10-CM

## 2021-04-08 RX ORDER — LEVOTHYROXINE SODIUM 0.1 MG/1
100 TABLET ORAL DAILY
Qty: 90 TABLET | Refills: 1 | Status: SHIPPED | OUTPATIENT
Start: 2021-04-08 | End: 2021-10-26 | Stop reason: SDUPTHER

## 2021-04-12 ENCOUNTER — TELEPHONE (OUTPATIENT)
Dept: INTERNAL MEDICINE | Facility: CLINIC | Age: 71
End: 2021-04-12

## 2021-04-12 DIAGNOSIS — R10.12 LUQ PAIN: ICD-10-CM

## 2021-04-12 RX ORDER — OMEPRAZOLE 20 MG/1
40 CAPSULE, DELAYED RELEASE ORAL DAILY
Qty: 60 CAPSULE | Refills: 10 | Status: SHIPPED | OUTPATIENT
Start: 2021-04-12 | End: 2022-01-12 | Stop reason: SDUPTHER

## 2021-04-12 RX ORDER — OMEPRAZOLE 20 MG/1
40 CAPSULE, DELAYED RELEASE ORAL DAILY
Qty: 60 CAPSULE | Refills: 10 | Status: SHIPPED | OUTPATIENT
Start: 2021-04-12 | End: 2021-04-12 | Stop reason: SDUPTHER

## 2021-04-12 NOTE — TELEPHONE ENCOUNTER
Caller: Best Adam    Relationship: Self    Best call back number: [phone]    Medication needed:   Requested Prescriptions     Pending Prescriptions Disp Refills   • omeprazole (priLOSEC) 20 MG capsule 60 capsule 10     Sig: Take 2 capsules by mouth Daily.       When do you need the refill by: ASAP    What additional details did the patient provide when requesting the medication:     Does the patient have less than a 3 day supply:  [x] Yes  [] No    What is the patient's preferred pharmacy: [address] [phone] [facility] [phone]

## 2021-04-12 NOTE — TELEPHONE ENCOUNTER
Caller: Best Adam    Relationship to patient: Self    Best call back number: [phone]    Patient is needing: LAST VISIT THERE WERE COME CHANGES TO PATIENTS MEDICATION, THE ONE HE IS WORRIED ABOUT IS THE atorvastatin (Lipitor) 40 MG tablet, HE SAYS IT WENT FROM 20 MG TO 40 MG AND JUST WANTED TO MAKE SURE THAT WAS RIGHT. HE HASN'T TAKEN IT SINCE HE PICKED IT. HE WOULD LIKE TO KNOW WHY THE CHANGE IN THE DOSAGE, HE DOESN'T REMEMBER TALKING ABOUT THAT.

## 2021-05-17 DIAGNOSIS — I10 ESSENTIAL HYPERTENSION: Chronic | ICD-10-CM

## 2021-05-17 RX ORDER — LOSARTAN POTASSIUM 100 MG/1
50 TABLET ORAL DAILY
Qty: 30 TABLET | Refills: 5 | Status: SHIPPED | OUTPATIENT
Start: 2021-05-17 | End: 2022-01-12 | Stop reason: SDUPTHER

## 2021-05-17 NOTE — TELEPHONE ENCOUNTER
Caller: Best Adam    Relationship: Self    Best call back number: [phone]    Medication needed:   Requested Prescriptions     Pending Prescriptions Disp Refills   • losartan (COZAAR) 100 MG tablet 30 tablet 5     Sig: Take 0.5 tablets by mouth Daily. 1/2 tab po daily       When do you need the refill by: 5/18/2021    What additional details did the patient provide when requesting the medication: WILL BE OUT BY FRIDAY    Does the patient have less than a 3 day supply:  [] Yes  [x] No    What is the patient's preferred pharmacy: [address] [phone] [facility] [phone] FX

## 2021-06-21 ENCOUNTER — TELEPHONE (OUTPATIENT)
Dept: INTERNAL MEDICINE | Facility: CLINIC | Age: 71
End: 2021-06-21

## 2021-06-21 RX ORDER — ESCITALOPRAM OXALATE 5 MG/1
5 TABLET ORAL DAILY
Qty: 90 TABLET | Refills: 3 | Status: SHIPPED | OUTPATIENT
Start: 2021-06-21 | End: 2022-07-07 | Stop reason: SDUPTHER

## 2021-06-21 NOTE — TELEPHONE ENCOUNTER
Caller: Best Adam    Relationship: Self    Best call back number: [phone]     Medication needed:   Requested Prescriptions     Pending Prescriptions Disp Refills   • escitalopram (LEXAPRO) 5 MG tablet       Sig: Take 1 tablet by mouth Daily.       When do you need the refill by: ASAP   What additional details did the patient provide when requesting the medication: PATIENT HAS 2 LEFT AND IS REQUESTING A QUANTITY OF 90   Does the patient have less than a 3 day supply:  [x] Yes  [] No    What is the patient's preferred pharmacy: [address] [phone] [facility] [phone]

## 2021-07-08 DIAGNOSIS — E78.49 OTHER HYPERLIPIDEMIA: Chronic | ICD-10-CM

## 2021-07-08 RX ORDER — ATORVASTATIN CALCIUM 40 MG/1
40 TABLET, FILM COATED ORAL DAILY
Qty: 90 TABLET | Refills: 0 | Status: SHIPPED | OUTPATIENT
Start: 2021-07-08 | End: 2021-07-12

## 2021-07-12 ENCOUNTER — OFFICE VISIT (OUTPATIENT)
Dept: INTERNAL MEDICINE | Facility: CLINIC | Age: 71
End: 2021-07-12

## 2021-07-12 VITALS
DIASTOLIC BLOOD PRESSURE: 80 MMHG | TEMPERATURE: 97.3 F | WEIGHT: 210 LBS | BODY MASS INDEX: 29.4 KG/M2 | SYSTOLIC BLOOD PRESSURE: 106 MMHG | HEIGHT: 71 IN

## 2021-07-12 DIAGNOSIS — F41.9 ANXIETY: ICD-10-CM

## 2021-07-12 DIAGNOSIS — I10 ESSENTIAL HYPERTENSION: Chronic | ICD-10-CM

## 2021-07-12 DIAGNOSIS — E03.8 OTHER SPECIFIED HYPOTHYROIDISM: Chronic | ICD-10-CM

## 2021-07-12 DIAGNOSIS — I72.4 BILATERAL POPLITEAL ARTERY ANEURYSM (HCC): ICD-10-CM

## 2021-07-12 DIAGNOSIS — Z00.00 MEDICARE ANNUAL WELLNESS VISIT, SUBSEQUENT: Primary | ICD-10-CM

## 2021-07-12 DIAGNOSIS — E78.49 OTHER HYPERLIPIDEMIA: Chronic | ICD-10-CM

## 2021-07-12 PROBLEM — C61 PROSTATE CA: Status: RESOLVED | Noted: 2019-05-07 | Resolved: 2021-07-12

## 2021-07-12 PROCEDURE — G0439 PPPS, SUBSEQ VISIT: HCPCS | Performed by: INTERNAL MEDICINE

## 2021-07-12 PROCEDURE — 99213 OFFICE O/P EST LOW 20 MIN: CPT | Performed by: INTERNAL MEDICINE

## 2021-07-12 RX ORDER — ATORVASTATIN CALCIUM 80 MG/1
80 TABLET, FILM COATED ORAL DAILY
Qty: 90 TABLET | Refills: 1 | Status: SHIPPED | OUTPATIENT
Start: 2021-07-12 | End: 2021-12-01

## 2021-07-12 NOTE — PROGRESS NOTES
The ABCs of the Annual Wellness Visit  Subsequent Medicare Wellness Visit    Chief Complaint   Patient presents with   • Medicare Wellness-subsequent       Subjective   History of Present Illness:  Best Adam is a 70 y.o. male who presents for a Subsequent Medicare Wellness Visit.  He has been doing well emotionally with Lexapro. He has not needed Xanax. He denies chest pain, dyspnea or abdominal pain. He sees Dr. Salazar twice per year for his prostate cancer. He has pain at the bases of his thumbs.  HEALTH RISK ASSESSMENT    Recent Hospitalizations:  No hospitalization(s) within the last year.    Current Medical Providers:  Patient Care Team:  Tim Kim MD as PCP - General (Internal Medicine)    Smoking Status:  Social History     Tobacco Use   Smoking Status Never Smoker   Smokeless Tobacco Never Used       Alcohol Consumption:  Social History     Substance and Sexual Activity   Alcohol Use Yes    Comment: 1 drink per day       Depression Screen:   PHQ-2/PHQ-9 Depression Screening 7/12/2021   Little interest or pleasure in doing things 0   Feeling down, depressed, or hopeless 0   Trouble falling or staying asleep, or sleeping too much -   Feeling tired or having little energy -   Poor appetite or overeating -   Feeling bad about yourself - or that you are a failure or have let yourself or your family down -   Trouble concentrating on things, such as reading the newspaper or watching television -   Moving or speaking so slowly that other people could have noticed. Or the opposite - being so fidgety or restless that you have been moving around a lot more than usual -   Thoughts that you would be better off dead, or of hurting yourself in some way -   Total Score 0   If you checked off any problems, how difficult have these problems made it for you to do your work, take care of things at home, or get along with other people? -       Fall Risk Screen:  DERRICKADI Fall Risk Assessment was completed, and patient  is at LOW risk for falls.Assessment completed on:7/12/2021    Health Habits and Functional and Cognitive Screening:  Functional & Cognitive Status 7/12/2021   Do you have difficulty preparing food and eating? No   Do you have difficulty bathing yourself, getting dressed or grooming yourself? No   Do you have difficulty using the toilet? No   Do you have difficulty moving around from place to place? No   Do you have trouble with steps or getting out of a bed or a chair? No   Current Diet Unhealthy Diet   Dental Exam Up to date        Dental Exam Comment -   Eye Exam Up to date   Exercise (times per week) 7 times per week   Current Exercises Include Cardiovascular Workout   Current Exercise Activities Include -   Do you need help using the phone?  No   Are you deaf or do you have serious difficulty hearing?  No   Do you need help with transportation? No   Do you need help shopping? No   Do you need help preparing meals?  No   Do you need help with housework?  No   Do you need help with laundry? No   Do you need help taking your medications? No   Do you need help managing money? No   Do you ever drive or ride in a car without wearing a seat belt? No   Have you felt unusual stress, anger or loneliness in the last month? No   Who do you live with? Alone   If you need help, do you have trouble finding someone available to you? No   Have you been bothered in the last four weeks by sexual problems? No   Do you have difficulty concentrating, remembering or making decisions? No         Does the patient have evidence of cognitive impairment? No    Asprin use counseling:Does not need ASA (and currently is not on it)    Age-appropriate Screening Schedule:  Refer to the list below for future screening recommendations based on patient's age, sex and/or medical conditions. Orders for these recommended tests are listed in the plan section. The patient has been provided with a written plan.    Health Maintenance   Topic Date Due   •  TDAP/TD VACCINES (1 - Tdap) Never done   • ZOSTER VACCINE (1 of 2) Never done   • INFLUENZA VACCINE  08/01/2021   • LIPID PANEL  07/07/2022          The following portions of the patient's history were reviewed and updated as appropriate: allergies, current medications, past family history, past medical history, past social history, past surgical history and problem list.    Outpatient Medications Prior to Visit   Medication Sig Dispense Refill   • cholecalciferol (VITAMIN D3) 1000 units tablet Take 1,000 Units by mouth Daily.     • escitalopram (LEXAPRO) 5 MG tablet Take 1 tablet by mouth Daily. 90 tablet 3   • levothyroxine (SYNTHROID, LEVOTHROID) 100 MCG tablet Take 1 tablet by mouth Daily. 90 tablet 1   • losartan (COZAAR) 100 MG tablet Take 0.5 tablets by mouth Daily. 1/2 tab po daily 30 tablet 5   • Multiple Vitamins-Minerals (OCUVITE ADULT 50+ PO) Take 1 tablet by mouth Daily.     • Omega-3 Fatty Acids (FISH OIL MAXIMUM STRENGTH) 1200 MG capsule delayed-release Take 1 tablet by mouth Daily.     • omeprazole (priLOSEC) 20 MG capsule Take 2 capsules by mouth Daily. 60 capsule 10   • Unable to find Take 2 each by mouth Daily. FD Guard     • atorvastatin (Lipitor) 40 MG tablet Take 1 tablet by mouth Daily. 90 tablet 0     No facility-administered medications prior to visit.       Patient Active Problem List   Diagnosis   • Other hyperlipidemia   • Essential hypertension   • Other specified hypothyroidism   • Anxiety   • Post-procedural erectile dysfunction   • RBBB   • Pain of upper abdomen   • Tubular adenoma   • Bilateral popliteal artery aneurysm (CMS/HCC)   • Raynaud's phenomenon without gangrene       Advanced Care Planning:  ACP discussion was held with the patient during this visit. Patient has an advance directive (not in EMR), copy requested.    Review of Systems    Compared to one year ago, the patient feels his physical health is the same.  Compared to one year ago, the patient feels his mental health  "is the same.    Reviewed chart for potential of high risk medication in the elderly: yes  Reviewed chart for potential of harmful drug interactions in the elderly:yes    Objective         Vitals:    07/12/21 0900   BP: 106/80   Temp: 97.3 °F (36.3 °C)   Weight: 95.3 kg (210 lb)   Height: 180.3 cm (71\")       Body mass index is 29.29 kg/m².  Discussed the patient's BMI with him. The BMI is above average; BMI management plan is completed.    Physical Exam  Vitals reviewed.   Constitutional:       Appearance: He is well-developed.   HENT:      Head: Normocephalic and atraumatic.   Neck:      Vascular: No carotid bruit.   Cardiovascular:      Rate and Rhythm: Normal rate and regular rhythm.      Heart sounds: Normal heart sounds, S1 normal and S2 normal.   Pulmonary:      Effort: Pulmonary effort is normal.      Breath sounds: Normal breath sounds.   Feet:      Comments: 2+ Dp/PT bilaterally. 5/5 strength in his feet  Skin:     General: Skin is warm.   Neurological:      Mental Status: He is alert.   Psychiatric:         Behavior: Behavior normal.         Lab Results   Component Value Date    GLU 96 07/07/2021    CHLPL 144 07/07/2021    TRIG 49 07/07/2021    HDL 53 07/07/2021    LDL 80 07/07/2021    VLDL 11 07/07/2021        Assessment/Plan   Medicare Risks and Personalized Health Plan  CMS Preventative Services Quick Reference  Advance Directive Discussion  Immunizations Discussed/Encouraged (specific immunizations; Tdap and Shingrix )    The above risks/problems have been discussed with the patient.  Pertinent information has been shared with the patient in the After Visit Summary.  Follow up plans and orders are seen below in the Assessment/Plan Section.    Diagnoses and all orders for this visit:    1. Medicare annual wellness visit, subsequent (Primary)    2. Other hyperlipidemia  -     atorvastatin (Lipitor) 80 MG tablet; Take 1 tablet by mouth Daily.  Dispense: 90 tablet; Refill: 1  -     Comprehensive Metabolic " Panel; Future  -     Lipid Panel With / Chol / HDL Ratio; Future    3. Essential hypertension    4. Other specified hypothyroidism  -     TSH; Future    5. Anxiety    6. Bilateral popliteal artery aneurysm (CMS/HCC)      Follow Up:  Return in about 3 months (around 10/12/2021), or 30 minutes, for Lab Before FUP.     An After Visit Summary and PPPS were given to the patient.           BP is good. TSH is at goal. Increase Lipitor to 80 mg to get LDL below 70. Doing well emotionally. I think he has OA in his right second finger. He will need to see vascular here next Feb/March.

## 2021-07-12 NOTE — PROGRESS NOTES
Subjective        Chief Complaint   Patient presents with   • Hyperlipidemia           Best Adam is a 70 y.o. male who presents for    Patient Active Problem List   Diagnosis   • Other hyperlipidemia   • Essential hypertension   • Other specified hypothyroidism   • Anxiety   • Prostate CA (CMS/HCC)   • Post-procedural erectile dysfunction   • RBBB   • Pain of upper abdomen   • Tubular adenoma   • Bilateral popliteal artery aneurysm (CMS/HCC)   • Raynaud's phenomenon without gangrene       History of Present Illness       No Known Allergies    Current Outpatient Medications on File Prior to Visit   Medication Sig Dispense Refill   • atorvastatin (Lipitor) 40 MG tablet Take 1 tablet by mouth Daily. 90 tablet 0   • cholecalciferol (VITAMIN D3) 1000 units tablet Take 1,000 Units by mouth Daily.     • escitalopram (LEXAPRO) 5 MG tablet Take 1 tablet by mouth Daily. 90 tablet 3   • levothyroxine (SYNTHROID, LEVOTHROID) 100 MCG tablet Take 1 tablet by mouth Daily. 90 tablet 1   • losartan (COZAAR) 100 MG tablet Take 0.5 tablets by mouth Daily. 1/2 tab po daily 30 tablet 5   • Multiple Vitamins-Minerals (OCUVITE ADULT 50+ PO) Take 1 tablet by mouth Daily.     • Omega-3 Fatty Acids (FISH OIL MAXIMUM STRENGTH) 1200 MG capsule delayed-release Take 1 tablet by mouth Daily.     • omeprazole (priLOSEC) 20 MG capsule Take 2 capsules by mouth Daily. 60 capsule 10   • Unable to find Take 2 each by mouth Daily. FD Guard       No current facility-administered medications on file prior to visit.       Past Medical History:   Diagnosis Date   • Arthritis    • Awareness under anesthesia     DURING COLONSCOPY   • Epigastric pain     FOR A COUPLE OF YEARS   • History of colon polyps    • History of prostate cancer 2011   • History of syncope 2006       Past Surgical History:   Procedure Laterality Date   • COLONOSCOPY  2012    polyps per pt   • COLONOSCOPY N/A 7/30/2019    tics, NBIH, TA x 2, HP x 1   • ENDOSCOPY N/A 7/30/2019    Z  "line irregular, 39-41 cm from incisors, Chronic H pylori gastritis, Mosley's esophagus   • INGUINAL HERNIA REPAIR Right 2013   • PROSTATECTOMY  2011       Family History   Problem Relation Age of Onset   • Neuropathy Mother    • Alzheimer's disease Father    • Lung cancer Father    • Breast cancer Sister    • Macular degeneration Sister    • Malig Hyperthermia Neg Hx        Social History     Socioeconomic History   • Marital status: Single     Spouse name: Not on file   • Number of children: Not on file   • Years of education: Not on file   • Highest education level: Not on file   Tobacco Use   • Smoking status: Never Smoker   • Smokeless tobacco: Never Used   Substance and Sexual Activity   • Alcohol use: Yes     Comment: 1 drink per day   • Drug use: No   • Sexual activity: Defer           The following portions of the patient's history were reviewed and updated as appropriate: {history reviewed:93411::\"problem list\",\"allergies\",\"current medications\",\"past medical history\",\"past family history\",\"past social history\",\"past surgical history\"}.    Review of Systems    Immunization History   Administered Date(s) Administered   • COVID-19 (PFIZER) 02/05/2021, 03/15/2021   • Fluzone High Dose =>65 Years ([employer] ONLY) 10/01/2018, 12/15/2019   • Pneumococcal Conjugate 13-Valent (PCV13) 06/25/2019   • Pneumococcal Polysaccharide (PPSV23) 06/30/2020       Objective   Vitals:    07/12/21 0900   Temp: 97.3 °F (36.3 °C)   Weight: 95.3 kg (210 lb)   Height: 180.3 cm (71\")     Body mass index is 29.29 kg/m².  Physical Exam    Procedures    Assessment/Plan   {Assess/PlanSmartLinks:27704}             No follow-ups on file.  "

## 2021-10-18 ENCOUNTER — OFFICE VISIT (OUTPATIENT)
Dept: INTERNAL MEDICINE | Facility: CLINIC | Age: 71
End: 2021-10-18

## 2021-10-18 VITALS
DIASTOLIC BLOOD PRESSURE: 80 MMHG | HEIGHT: 71 IN | TEMPERATURE: 97.8 F | WEIGHT: 211 LBS | BODY MASS INDEX: 29.54 KG/M2 | SYSTOLIC BLOOD PRESSURE: 116 MMHG

## 2021-10-18 DIAGNOSIS — E03.8 OTHER SPECIFIED HYPOTHYROIDISM: Primary | Chronic | ICD-10-CM

## 2021-10-18 DIAGNOSIS — M70.71 BURSITIS OF RIGHT HIP, UNSPECIFIED BURSA: ICD-10-CM

## 2021-10-18 DIAGNOSIS — I72.4 BILATERAL POPLITEAL ARTERY ANEURYSM (HCC): ICD-10-CM

## 2021-10-18 DIAGNOSIS — I10 ESSENTIAL HYPERTENSION: Chronic | ICD-10-CM

## 2021-10-18 DIAGNOSIS — E78.49 OTHER HYPERLIPIDEMIA: Chronic | ICD-10-CM

## 2021-10-18 PROCEDURE — 99214 OFFICE O/P EST MOD 30 MIN: CPT | Performed by: INTERNAL MEDICINE

## 2021-10-18 NOTE — PROGRESS NOTES
Subjective        Chief Complaint   Patient presents with   • Hyperlipidemia           Bset Adam is a 70 y.o. male who presents for    Patient Active Problem List   Diagnosis   • Other hyperlipidemia   • Essential hypertension   • Other specified hypothyroidism   • Anxiety   • Post-procedural erectile dysfunction   • RBBB   • Pain of upper abdomen   • Tubular adenoma   • Bilateral popliteal artery aneurysm (HCC)   • Raynaud's phenomenon without gangrene       History of Present Illness     He has some discomfort in the posterolateral aspect of his right knee. He has some pain in his right hip. He notices it intermittently. Nothing makes it worse. APAP makes it better. He has not been checking his BP. He exercises 5-6 days per week without chest pain or dyspnea. He saw his urologist last week.  No Known Allergies    Current Outpatient Medications on File Prior to Visit   Medication Sig Dispense Refill   • atorvastatin (Lipitor) 80 MG tablet Take 1 tablet by mouth Daily. 90 tablet 1   • cholecalciferol (VITAMIN D3) 1000 units tablet Take 1,000 Units by mouth Daily.     • escitalopram (LEXAPRO) 5 MG tablet Take 1 tablet by mouth Daily. 90 tablet 3   • levothyroxine (SYNTHROID, LEVOTHROID) 100 MCG tablet Take 1 tablet by mouth Daily. 90 tablet 1   • losartan (COZAAR) 100 MG tablet Take 0.5 tablets by mouth Daily. 1/2 tab po daily 30 tablet 5   • Omega-3 Fatty Acids (FISH OIL MAXIMUM STRENGTH) 1200 MG capsule delayed-release Take 1 tablet by mouth Daily.     • omeprazole (priLOSEC) 20 MG capsule Take 2 capsules by mouth Daily. 60 capsule 10   • Unable to find Take 2 each by mouth Daily. FD Guard     • Multiple Vitamins-Minerals (OCUVITE ADULT 50+ PO) Take 1 tablet by mouth Daily.       No current facility-administered medications on file prior to visit.       Past Medical History:   Diagnosis Date   • Arthritis    • Awareness under anesthesia     DURING COLONSCOPY   • Epigastric pain     FOR A COUPLE OF YEARS   •  "History of colon polyps    • History of prostate cancer 2011   • History of syncope 2006       Past Surgical History:   Procedure Laterality Date   • COLONOSCOPY  2012    polyps per pt   • COLONOSCOPY N/A 7/30/2019    tics, NBIH, TA x 2, HP x 1   • ENDOSCOPY N/A 7/30/2019    Z line irregular, 39-41 cm from incisors, Chronic H pylori gastritis, Mosley's esophagus   • INGUINAL HERNIA REPAIR Right 2013   • PROSTATECTOMY  2011       Family History   Problem Relation Age of Onset   • Neuropathy Mother    • Alzheimer's disease Father    • Lung cancer Father    • Breast cancer Sister    • Macular degeneration Sister    • Malig Hyperthermia Neg Hx        Social History     Socioeconomic History   • Marital status: Single   Tobacco Use   • Smoking status: Never Smoker   • Smokeless tobacco: Never Used   Substance and Sexual Activity   • Alcohol use: Yes     Comment: 1 drink per day   • Drug use: No   • Sexual activity: Defer           The following portions of the patient's history were reviewed and updated as appropriate: problem list, allergies, current medications, past medical history, past family history, past social history and past surgical history.    Review of Systems    Immunization History   Administered Date(s) Administered   • COVID-19 (PFIZER) 02/05/2021, 03/15/2021, 09/27/2021   • Fluzone High Dose =>65 Years ([employer] ONLY) 10/01/2018, 12/15/2019, 10/11/2021   • Pneumococcal Conjugate 13-Valent (PCV13) 06/25/2019   • Pneumococcal Polysaccharide (PPSV23) 06/30/2020       Objective   Vitals:    10/18/21 1259   BP: 116/80   Temp: 97.8 °F (36.6 °C)   Weight: 95.7 kg (211 lb)   Height: 180.3 cm (71\")     Body mass index is 29.43 kg/m².  Physical Exam  Vitals reviewed.   Constitutional:       Appearance: He is well-developed.   HENT:      Head: Normocephalic and atraumatic.   Cardiovascular:      Rate and Rhythm: Normal rate and regular rhythm.      Heart sounds: Normal heart sounds, S1 normal and S2 normal. "   Pulmonary:      Effort: Pulmonary effort is normal.      Breath sounds: Normal breath sounds.   Musculoskeletal:      Comments: Right hip FROM. Bursa non tender to palpation    I do not feel aneurysm behind knee.   Skin:     General: Skin is warm.   Neurological:      Mental Status: He is alert.   Psychiatric:         Behavior: Behavior normal.         Procedures    Assessment/Plan   Diagnoses and all orders for this visit:    1. Other specified hypothyroidism (Primary)  -     TSH; Future    2. Other hyperlipidemia    3. Essential hypertension    4. Bilateral popliteal artery aneurysm (HCC)  Comments:  Get in with vascular to get their thoughts about pain behind knee  Orders:  -     Ambulatory Referral to Vascular Surgery    5. Bursitis of right hip, unspecified bursa  Comments:  Continue APAP               Reviewed cmp, flp and tsh. Recc Tdap and Shingrix. BP and LDL are at goal. Doubt pain above posterior aspect right knee is aneurysm. I will have him see vascular to get their input.  Return in about 6 months (around 4/18/2022), or 30 minutes, for Lab Before FUP.

## 2021-10-26 ENCOUNTER — TELEPHONE (OUTPATIENT)
Dept: INTERNAL MEDICINE | Facility: CLINIC | Age: 71
End: 2021-10-26

## 2021-10-26 DIAGNOSIS — E03.8 OTHER SPECIFIED HYPOTHYROIDISM: ICD-10-CM

## 2021-10-26 RX ORDER — LEVOTHYROXINE SODIUM 0.1 MG/1
100 TABLET ORAL DAILY
Qty: 90 TABLET | Refills: 1 | Status: SHIPPED | OUTPATIENT
Start: 2021-10-26 | End: 2022-04-29 | Stop reason: SDUPTHER

## 2021-10-26 NOTE — TELEPHONE ENCOUNTER
Caller: Best Adam    Relationship: Self  Requested Prescriptions:   Requested Prescriptions     Pending Prescriptions Disp Refills   • levothyroxine (SYNTHROID, LEVOTHROID) 100 MCG tablet 90 tablet 1     Sig: Take 1 tablet by mouth Daily.        Pharmacy where request should be sent: [address] - [phone]  - [phone]   [phone]    Additional details provided by patient: PATIENT HAS 5 LEFT   Best call back number: [phone]    Does the patient have less than a 3 day supply:  [] Yes  [x] No    Bubba Joshi Rep   10/26/21 11:25 EDT

## 2021-11-22 ENCOUNTER — OFFICE VISIT (OUTPATIENT)
Dept: GASTROENTEROLOGY | Facility: CLINIC | Age: 71
End: 2021-11-22

## 2021-11-22 ENCOUNTER — TELEPHONE (OUTPATIENT)
Dept: GASTROENTEROLOGY | Facility: CLINIC | Age: 71
End: 2021-11-22

## 2021-11-22 VITALS — WEIGHT: 211.3 LBS | HEIGHT: 71 IN | BODY MASS INDEX: 29.58 KG/M2 | TEMPERATURE: 96.9 F

## 2021-11-22 DIAGNOSIS — K22.70 BARRETT'S ESOPHAGUS WITHOUT DYSPLASIA: Primary | ICD-10-CM

## 2021-11-22 DIAGNOSIS — K63.5 POLYP OF COLON, UNSPECIFIED PART OF COLON, UNSPECIFIED TYPE: ICD-10-CM

## 2021-11-22 PROCEDURE — 99214 OFFICE O/P EST MOD 30 MIN: CPT | Performed by: INTERNAL MEDICINE

## 2021-11-22 NOTE — PROGRESS NOTES
Chief Complaint   Patient presents with   • Change in Bowel Movements     Subjective     HPI  Best Adam is a 71 y.o. male who presents today for follow up.  The gentleman is tending towards diarrhea with excess coffee intake  He is taking Metamucil but does not like the taste he would like some suggestions with regard to fiber  He has a history of colon polyps  History of Mosley's esophagus  Takes omeprazole daily with very rare breakthrough symptoms  No dysphagia  No weight loss  No reason to move up his scheduled scopes for July 2022    Objective   Vitals:    11/22/21 1508   Temp: 96.9 °F (36.1 °C)       Physical Exam  Vitals and nursing note reviewed.   Constitutional:       Appearance: He is well-developed.   HENT:      Head: Normocephalic and atraumatic.   Eyes:      Conjunctiva/sclera: Conjunctivae normal.   Neck:      Trachea: No tracheal deviation.   Pulmonary:      Effort: Pulmonary effort is normal. No respiratory distress.   Abdominal:      General: Bowel sounds are normal. There is no distension.      Palpations: Abdomen is soft. There is no mass.      Tenderness: There is no abdominal tenderness. There is no guarding or rebound.      Hernia: No hernia is present.   Genitourinary:     Rectum: Normal. No anal fissure, external hemorrhoid or internal hemorrhoid. Normal anal tone.   Musculoskeletal:         General: Normal range of motion.      Cervical back: Normal range of motion.   Skin:     General: Skin is warm and dry.   Neurological:      Mental Status: He is alert and oriented to person, place, and time.   Psychiatric:         Behavior: Behavior normal.         Thought Content: Thought content normal.         Judgment: Judgment normal.       The following data was reviewed by: Candelario Mills MD on 11/22/2021:  Common labs    Common Labsle 7/7/21 7/7/21 10/11/21 10/11/21    0952 0952 0853 0853   Glucose 96  96    BUN 17  21    Creatinine 0.91  0.87    eGFR Non  Am 83  87    eGFR  African Am 100  106    Sodium 140  142    Potassium 4.6  4.5    Chloride 106  106    Calcium 9.4  9.0    Total Protein 6.7  6.3    Albumin 4.60  4.50    Total Bilirubin 0.8  0.6    Alkaline Phosphatase 79  79    AST (SGOT) 23  24    ALT (SGPT) 31  33    Total Cholesterol  144  120   Triglycerides  49  52   HDL Cholesterol  53  51   LDL Cholesterol   80  57      Comments are available for some flowsheets but are not being displayed.           CMP    CMP 7/7/21 10/11/21   Glucose 96 96   BUN 17 21   Creatinine 0.91 0.87   eGFR Non  Am 83 87   eGFR African Am 100 106   Sodium 140 142   Potassium 4.6 4.5   Chloride 106 106   Calcium 9.4 9.0   Total Protein 6.7 6.3   Albumin 4.60 4.50   Globulin 2.1 1.8   Total Bilirubin 0.8 0.6   Alkaline Phosphatase 79 79   AST (SGOT) 23 24   ALT (SGPT) 31 33      Comments are available for some flowsheets but are not being displayed.                 Lipid Panel    Lipid Panel 7/7/21 10/11/21   Total Cholesterol 144 120   Triglycerides 49 52   HDL Cholesterol 53 51   VLDL Cholesterol 11 12   LDL Cholesterol  80 57      Comments are available for some flowsheets but are not being displayed.           Data reviewed: GI studies [facility] EGD and colonoscopy reports reviewed with pathology     Assessment/Plan   Assessment:     Mosley's esophagus  Colon polyps  GERD  Occasional diarrhea in the mid afternoon    Plan:   He will reduce his caffeine intake in the morning  He will continue fiber but use fiber chewables which are more palatable compared to Metamucil  We will schedule his EGD and colonoscopy for July 2022 which is when he is due for surveillance    I spent 35 minutes caring for Best on this date of service. This time includes time spent by me in the following activities:preparing for the visit, reviewing tests, obtaining and/or reviewing a separately obtained history, performing a medically appropriate examination and/or evaluation , counseling and educating the  patient/family/caregiver, ordering medications, tests, or procedures, referring and communicating with other health care professionals , documenting information in the medical record, independently interpreting results and communicating that information with the patient/family/caregiver and care coordination    Candelario Mills MD  [facility]  [address] [place] [address]  Office: [phone]

## 2021-11-22 NOTE — TELEPHONE ENCOUNTER
SW  for colonoscopy on 07/15/2022  arrive at 7am   . Mailed Prep instructions to Mailing address on-file. ----miralax      Advised PT  that  will call with final arrival time  24 hrs before procedure. If they do not get a phone call, arrival time will stay the same as given on instructions

## 2021-12-01 DIAGNOSIS — E78.49 OTHER HYPERLIPIDEMIA: Chronic | ICD-10-CM

## 2021-12-01 RX ORDER — ATORVASTATIN CALCIUM 80 MG/1
TABLET, FILM COATED ORAL
Qty: 90 TABLET | Refills: 3 | Status: SHIPPED | OUTPATIENT
Start: 2021-12-01 | End: 2021-12-02

## 2021-12-02 DIAGNOSIS — E78.49 OTHER HYPERLIPIDEMIA: Chronic | ICD-10-CM

## 2021-12-02 RX ORDER — ATORVASTATIN CALCIUM 80 MG/1
TABLET, FILM COATED ORAL
Qty: 90 TABLET | Refills: 3 | Status: SHIPPED | OUTPATIENT
Start: 2021-12-02 | End: 2022-03-10 | Stop reason: SDUPTHER

## 2021-12-27 ENCOUNTER — HOSPITAL ENCOUNTER (OUTPATIENT)
Dept: CT IMAGING | Facility: HOSPITAL | Age: 71
Discharge: HOME | End: 2021-12-27
Admitting: INTERNAL MEDICINE

## 2021-12-27 ENCOUNTER — TELEPHONE (OUTPATIENT)
Dept: INTERNAL MEDICINE | Facility: CLINIC | Age: 71
End: 2021-12-27

## 2021-12-27 ENCOUNTER — OFFICE VISIT (OUTPATIENT)
Dept: INTERNAL MEDICINE | Facility: CLINIC | Age: 71
End: 2021-12-27

## 2021-12-27 VITALS — BODY MASS INDEX: 28.7 KG/M2 | WEIGHT: 205 LBS | TEMPERATURE: 97.3 F | HEIGHT: 71 IN

## 2021-12-27 DIAGNOSIS — R10.32 LLQ PAIN: ICD-10-CM

## 2021-12-27 DIAGNOSIS — R10.9 ABDOMINAL PAIN, UNSPECIFIED ABDOMINAL LOCATION: Primary | ICD-10-CM

## 2021-12-27 LAB
BILIRUB BLD-MCNC: NEGATIVE MG/DL
CLARITY, POC: CLEAR
COLOR UR: YELLOW
CREAT BLDA-MCNC: 1.1 MG/DL (ref 0.6–1.3)
EXPIRATION DATE: ABNORMAL
GLUCOSE UR STRIP-MCNC: NEGATIVE MG/DL
KETONES UR QL: ABNORMAL
LEUKOCYTE EST, POC: NEGATIVE
Lab: ABNORMAL
NITRITE UR-MCNC: NEGATIVE MG/ML
PH UR: 6 [PH] (ref 5–8)
PROT UR STRIP-MCNC: NEGATIVE MG/DL
RBC # UR STRIP: NEGATIVE /UL
SP GR UR: 1.01 (ref 1–1.03)
UROBILINOGEN UR QL: NORMAL

## 2021-12-27 PROCEDURE — 81003 URINALYSIS AUTO W/O SCOPE: CPT | Performed by: INTERNAL MEDICINE

## 2021-12-27 PROCEDURE — 74177 CT ABD & PELVIS W/CONTRAST: CPT

## 2021-12-27 PROCEDURE — 25010000002 IOPAMIDOL 61 % SOLUTION: Performed by: INTERNAL MEDICINE

## 2021-12-27 PROCEDURE — 99214 OFFICE O/P EST MOD 30 MIN: CPT | Performed by: INTERNAL MEDICINE

## 2021-12-27 PROCEDURE — 82565 ASSAY OF CREATININE: CPT

## 2021-12-27 RX ADMIN — IOPAMIDOL 85 ML: 612 INJECTION, SOLUTION INTRAVENOUS at 16:11

## 2021-12-27 NOTE — PROGRESS NOTES
"Chief Complaint  Abdominal Pain and Back Pain    Subjective          Best Adam presents to [facility]  History of Present Illness  Started having abd pain 5 days ago- ate a large dinner late at night- had some abd pain afterward- has not been eating or drinking much since then to see if he could get symptoms to subside.  Pain is just to L of midline and radiates around to low back- there is no nausea/ diarrhea/constipation/uriinary changes.  He is not eating much- bowl of rice yesterday- drinking water or coffee. Can't tell anything that makes it worse or better.  Initially felt a pressure-  He had c-scope in 2019- found polyps and tics.  Has never had a flare of tics.  He says the pain is best when standing and worse when lying down. He actually presses on the area of give him relief momentarily.    Objective   Vital Signs:   Temp 97.3 °F (36.3 °C)   Ht 180.3 cm (71\")   Wt 93 kg (205 lb)   BMI 28.59 kg/m²     Physical Exam  Constitutional:       Appearance: Normal appearance.   Cardiovascular:      Rate and Rhythm: Normal rate.   Pulmonary:      Effort: Pulmonary effort is normal.   Abdominal:      General: Bowel sounds are normal. There is no distension.      Tenderness: Tenderness: subj but no objective sign of tenderness including guarding reboudn.      Comments: No bulging with standing, pressure   Musculoskeletal:      Right lower leg: No edema.      Left lower leg: No edema.   Lymphadenopathy:      Cervical: No cervical adenopathy.        Result Review :                 Assessment and Plan    Diagnoses and all orders for this visit:    1. Abdominal pain, unspecified abdominal location (Primary)  Comments:  urine dip negative  Orders:  -     POCT urinalysis dipstick, automated    2. LLQ pain  Comments:  concerning for tics- will check CT and treat accordingly.   No pain meds until dx made.   Orders:  -     CT Abdomen Pelvis With Contrast        Follow Up   No follow-ups " on file.  Patient was given instructions and counseling regarding his condition or for health maintenance advice. Please see specific information pulled into the AVS if appropriate.

## 2021-12-27 NOTE — TELEPHONE ENCOUNTER
Caller: Best Adam     Relationship:   Best call back number:[phone]      What is your medical concern?     LOWER ABDOMINAL PAIN/LOWER BACK PAIN    How long has this issue been going on? 12/23/2021    Is your provider already aware of this issue? NO    Have you been treated for this issue? NO      ADDITIONAL NOTES:    IN THE LAST 24 HOURS IT HAS GOTTEN WORSE AND PATIENT STATES THAT HE IS AT A LEVEL OF 4 AS FOR THE PAIN.    HE HAS NOT BEEN ABLE TO EAT SINCE THE ON SET OF CONDITIONS.    PATIENT IS HOPING THAT SOMEONE CAN PLEASE PLEASE PLEASE SQUEEZE HIM IN TODAY.  HE HAS BEEN TREATING THIS CONDITION WITH EXTRA STRENGTH ASPRIN AND IT IS HAVING NO IMPACT ON THE PAIN.    PATIENT STATED HE DOES NOT FEEL SAFE GOING TO THE ER.    PLEASE CALL PATIENT AND ADVISE.

## 2021-12-27 NOTE — NURSING NOTE
Dr. Bratton called and stated pt may leave.  Pt informed nothing acute was identified. Pt aware. IV removed. Pt shown to main entrance. NAD noted.

## 2021-12-29 ENCOUNTER — TELEPHONE (OUTPATIENT)
Dept: INTERNAL MEDICINE | Facility: CLINIC | Age: 71
End: 2021-12-29

## 2021-12-30 ENCOUNTER — OFFICE VISIT (OUTPATIENT)
Dept: INTERNAL MEDICINE | Facility: CLINIC | Age: 71
End: 2021-12-30

## 2021-12-30 VITALS — WEIGHT: 205.5 LBS | TEMPERATURE: 97.8 F | BODY MASS INDEX: 28.77 KG/M2 | HEIGHT: 71 IN

## 2021-12-30 DIAGNOSIS — R10.32 LLQ PAIN: Primary | ICD-10-CM

## 2021-12-30 PROCEDURE — 99213 OFFICE O/P EST LOW 20 MIN: CPT | Performed by: INTERNAL MEDICINE

## 2021-12-30 RX ORDER — MELOXICAM 15 MG/1
15 TABLET ORAL DAILY
Qty: 30 TABLET | Refills: 0 | Status: SHIPPED | OUTPATIENT
Start: 2021-12-30 | End: 2022-01-31

## 2021-12-30 RX ORDER — CYCLOBENZAPRINE HCL 10 MG
10 TABLET ORAL 2 TIMES DAILY PRN
Qty: 20 TABLET | Refills: 0 | Status: SHIPPED | OUTPATIENT
Start: 2021-12-30 | End: 2022-01-10 | Stop reason: SDUPTHER

## 2021-12-30 NOTE — PROGRESS NOTES
Subjective        Chief Complaint   Patient presents with   • Abdominal Pain           Best Adam is a 71 y.o. male who presents for    Patient Active Problem List   Diagnosis   • Other hyperlipidemia   • Essential hypertension   • Other specified hypothyroidism   • Anxiety   • Post-procedural erectile dysfunction   • RBBB   • Pain of upper abdomen   • Tubular adenoma   • Bilateral popliteal artery aneurysm (HCC)   • Raynaud's phenomenon without gangrene       History of Present Illness     He has pain in his LLQ that radiates to the flank. Nothing makes it better. He thinks it started after eating too much last week. He denies fever, chills, melena, hematochezia, diarrhea, or hematuria. The pain is constant. He has tried extra strength tylenol. It is worse bending down. There is no FH of PCKD.  No Known Allergies    Current Outpatient Medications on File Prior to Visit   Medication Sig Dispense Refill   • atorvastatin (LIPITOR) 80 MG tablet Take 1 tablet by mouth once daily 90 tablet 3   • cholecalciferol (VITAMIN D3) 1000 units tablet Take 1,000 Units by mouth Daily.     • escitalopram (LEXAPRO) 5 MG tablet Take 1 tablet by mouth Daily. 90 tablet 3   • levothyroxine (SYNTHROID, LEVOTHROID) 100 MCG tablet Take 1 tablet by mouth Daily. 90 tablet 1   • losartan (COZAAR) 100 MG tablet Take 0.5 tablets by mouth Daily. 1/2 tab po daily 30 tablet 5   • Multiple Vitamins-Minerals (OCUVITE ADULT 50+ PO) Take 1 tablet by mouth Daily.     • Omega-3 Fatty Acids (FISH OIL MAXIMUM STRENGTH) 1200 MG capsule delayed-release Take 1 tablet by mouth Daily.     • omeprazole (priLOSEC) 20 MG capsule Take 2 capsules by mouth Daily. 60 capsule 10   • Unable to find Take 2 each by mouth Daily. FD Guard       No current facility-administered medications on file prior to visit.       Past Medical History:   Diagnosis Date   • Awareness under anesthesia     DURING COLONSCOPY   • Mosley esophagus 2019   • Cancer (HCC) 2011   •  Diverticulitis of colon 2019   • Hernia 2013    Inguinal hernia, repaired in 2013   • History of prostate cancer 2011   • History of syncope 2006   • Renal cyst, right 12/2021       Past Surgical History:   Procedure Laterality Date   • COLONOSCOPY  2012    polyps per pt   • COLONOSCOPY N/A 7/30/2019    tics, NBIH, TA x 2, HP x 1   • ENDOSCOPY N/A 7/30/2019    Z line irregular, 39-41 cm from incisors, Chronic H pylori gastritis, Mosley's esophagus   • INGUINAL HERNIA REPAIR Right 2013   • PROSTATECTOMY  2011   • UPPER GASTROINTESTINAL ENDOSCOPY  7/30/2019       Family History   Problem Relation Age of Onset   • Neuropathy Mother    • Alzheimer's disease Father    • Lung cancer Father    • Colon polyps Father    • Breast cancer Sister    • Macular degeneration Sister    • Malig Hyperthermia Neg Hx        Social History     Socioeconomic History   • Marital status: Single   Tobacco Use   • Smoking status: Never Smoker   • Smokeless tobacco: Never Used   Substance and Sexual Activity   • Alcohol use: Yes     Alcohol/week: 13.0 standard drinks     Types: 6 Glasses of wine, 3 Cans of beer, 4 Standard drinks or equivalent per week     Comment: 1 drink per day   • Drug use: No   • Sexual activity: Not Currently     Partners: Female     Birth control/protection: None           The following portions of the patient's history were reviewed and updated as appropriate: problem list, allergies, current medications, past medical history, past family history, past social history and past surgical history.    Review of Systems    Immunization History   Administered Date(s) Administered   • COVID-19 (PFIZER) 02/05/2021, 03/15/2021, 09/27/2021   • Fluzone High Dose =>65 Years ([employer] ONLY) 10/01/2018, 12/15/2019, 10/11/2021   • Pneumococcal Conjugate 13-Valent (PCV13) 06/25/2019   • Pneumococcal Polysaccharide (PPSV23) 06/30/2020       Objective   Vitals:    12/30/21 0845   Temp: 97.8 °F (36.6 °C)   Weight: 93.2 kg (205 lb 8 oz)  "  Height: 180.3 cm (70.98\")     Body mass index is 28.67 kg/m².  Physical Exam  Vitals reviewed.   Abdominal:      General: There is no distension.      Palpations: There is no mass.      Tenderness: There is no abdominal tenderness.      Hernia: No hernia is present. There is no hernia in the left inguinal area or right inguinal area.      Comments: Back non tender   Lymphadenopathy:      Lower Body: No left inguinal adenopathy.   Neurological:      Mental Status: He is alert and oriented to person, place, and time.   Psychiatric:         Mood and Affect: Mood normal.         Procedures    Assessment/Plan   Diagnoses and all orders for this visit:    1. LLQ pain (Primary)  Comments:  CT and UA negative with nl exam. I wonder if coming from his back. Trial NSAID, Flexeril, and heat  Orders:  -     meloxicam (MOBIC) 15 MG tablet; Take 1 tablet by mouth Daily.  Dispense: 30 tablet; Refill: 0  -     cyclobenzaprine (FLEXERIL) 10 MG tablet; Take 1 tablet by mouth 2 (Two) Times a Day As Needed for Muscle Spasms.  Dispense: 20 tablet; Refill: 0               Okay to eat nl diet.   No follow-ups on file.  "

## 2022-01-06 ENCOUNTER — TELEPHONE (OUTPATIENT)
Dept: INTERNAL MEDICINE | Facility: CLINIC | Age: 72
End: 2022-01-06

## 2022-01-06 NOTE — TELEPHONE ENCOUNTER
Provider: NUVIA BRATTON    Caller: DEMETRIO CAMP    Relationship to Patient: SELF    Pharmacy: N/A    Phone Number: [phone]    Reason for Call: PATIENT JUST RECEIVED A POSITIVE COVID TEST AND IS WANTING INFORMATION ON QUARANTINE LENGTH.     PATIENT STATES HE HAS NO SYMPTOMS AT THIS TIME.      PLEASE CALL TO ADVISE OR LEAVE MESSAGE ON HIS [facility].

## 2022-01-06 NOTE — TELEPHONE ENCOUNTER
Check CDC website as constantly changing. If all 3 shots then it is 5 days followed by mask next 5 days

## 2022-01-10 ENCOUNTER — TELEPHONE (OUTPATIENT)
Dept: INTERNAL MEDICINE | Facility: CLINIC | Age: 72
End: 2022-01-10

## 2022-01-10 DIAGNOSIS — R10.32 LLQ PAIN: ICD-10-CM

## 2022-01-10 RX ORDER — CYCLOBENZAPRINE HCL 10 MG
10 TABLET ORAL 2 TIMES DAILY PRN
Qty: 20 TABLET | Refills: 0 | Status: SHIPPED | OUTPATIENT
Start: 2022-01-10 | End: 2022-01-31

## 2022-01-10 NOTE — TELEPHONE ENCOUNTER
Caller: Best Adam    Relationship: Self    Best call back number:[phone]    What medication are you requesting: cyclobenzaprine 10MG    What are your current symptoms: STILL HAVING A LOT OF ABDOMINAL PAIN, BACK PAIN HAS IMPROVED    How long have you been experiencing symptoms: SINCE APPOINTMENT.    Have you had these symptoms before:    [x] Yes  [] No    Have you been treated for these symptoms before:   [x] Yes  [] No    If a prescription is needed, what is your preferred pharmacy and phone number:    [address] [place], [place] - [address] [facility] [phone] [facility] [phone]   [phone]  Additional notes: PATIENT WOULD LIKE A REFILL OF THIS MEDICATION SINCE IT WAS ONLY PRESCRIBED FOR 10 DAYS OR WOULD LIKE SOMETHING ELSE TO HELP WITH PAIN.     PATIENT ALSO WANTS TO DOUBLE CHECK TO MAKE SURE THERE ISNT ANY THING ELSE THAT CAN BE DONE DIAGNOSTICALLY.

## 2022-01-10 NOTE — TELEPHONE ENCOUNTER
I sent in more muscle relaxant. If still having abdominal pain, he should call Dr. Mills to be seen in their office

## 2022-01-12 DIAGNOSIS — R10.12 LUQ PAIN: ICD-10-CM

## 2022-01-12 DIAGNOSIS — I10 ESSENTIAL HYPERTENSION: Chronic | ICD-10-CM

## 2022-01-12 RX ORDER — OMEPRAZOLE 20 MG/1
CAPSULE, DELAYED RELEASE ORAL
Qty: 180 CAPSULE | Refills: 0 | Status: SHIPPED | OUTPATIENT
Start: 2022-01-12 | End: 2022-03-29 | Stop reason: SDUPTHER

## 2022-01-12 RX ORDER — LOSARTAN POTASSIUM 100 MG/1
50 TABLET ORAL DAILY
Qty: 30 TABLET | Refills: 5 | Status: SHIPPED | OUTPATIENT
Start: 2022-01-12 | End: 2022-04-18 | Stop reason: SDUPTHER

## 2022-01-12 NOTE — TELEPHONE ENCOUNTER
Caller: Best Adam    Relationship: Self    Best call back number: [phone]    Requested Prescriptions:   Requested Prescriptions     Pending Prescriptions Disp Refills   • losartan (COZAAR) 100 MG tablet 30 tablet 5     Sig: Take 0.5 tablets by mouth Daily. 1/2 tab po daily        Pharmacy where request should be sent: [id number] [address] AT [facility] OF [address] - [phone] [facility] [phone] FX     Additional details provided by patient: PATIENT STATES THAT [facility] DOESN'T HAVE MEDICATION AND IT IS BACKORDERED. PATIENT ONLY WANTS TO BE FILLED ONE TIME AT [facility]. HE HAS 2 PILLS LEFT.    Does the patient have less than a 3 day supply:  [x] Yes  [] No    Bubba Murphy Rep   01/12/22 15:18 EST

## 2022-01-17 PROBLEM — K63.5 POLYP OF COLON: Status: ACTIVE | Noted: 2022-01-17

## 2022-01-17 PROBLEM — K22.70 BARRETT'S ESOPHAGUS WITHOUT DYSPLASIA: Status: ACTIVE | Noted: 2022-01-17

## 2022-01-31 ENCOUNTER — OFFICE VISIT (OUTPATIENT)
Dept: GASTROENTEROLOGY | Facility: CLINIC | Age: 72
End: 2022-01-31

## 2022-01-31 ENCOUNTER — TELEPHONE (OUTPATIENT)
Dept: GASTROENTEROLOGY | Facility: CLINIC | Age: 72
End: 2022-01-31

## 2022-01-31 VITALS — WEIGHT: 196.8 LBS | BODY MASS INDEX: 27.55 KG/M2 | TEMPERATURE: 96.8 F | HEIGHT: 71 IN

## 2022-01-31 DIAGNOSIS — K22.70 BARRETT'S ESOPHAGUS WITHOUT DYSPLASIA: ICD-10-CM

## 2022-01-31 DIAGNOSIS — K21.9 GASTROESOPHAGEAL REFLUX DISEASE, UNSPECIFIED WHETHER ESOPHAGITIS PRESENT: ICD-10-CM

## 2022-01-31 DIAGNOSIS — R19.4 CHANGE IN BOWEL HABITS: ICD-10-CM

## 2022-01-31 DIAGNOSIS — K63.5 POLYP OF COLON, UNSPECIFIED PART OF COLON, UNSPECIFIED TYPE: ICD-10-CM

## 2022-01-31 DIAGNOSIS — R10.32 LEFT LOWER QUADRANT ABDOMINAL PAIN: Primary | ICD-10-CM

## 2022-01-31 PROCEDURE — 99214 OFFICE O/P EST MOD 30 MIN: CPT | Performed by: NURSE PRACTITIONER

## 2022-01-31 NOTE — TELEPHONE ENCOUNTER
TAMELA Wilkerson for colonoscopy/EGD on 07/15/2022 to 04/29/2022  arrive at 10am   . Gave Prep instructions in office. ----miralax    OK Per BG

## 2022-01-31 NOTE — PROGRESS NOTES
Chief Complaint   Patient presents with   • Abdominal Pain       HPI    Best Adam is a  71 y.o. male here with a personal history of colon polyps and Mosley's esophagus for a follow up visit for abdominal pain.    Prescheduled for bidirectional scopic evaluation in July.    This patient follows with Dr. Mills, new to me.    Covid + January 6.    Today reports onset of left lower quadrant abdominal pain while out of town during [place] possibly triggered by eating rich foods.  Pain persisted and he saw his primary care provider.  He had a CT of the abdomen pelvis during that timeframe that did not show any evidence of diverticulitis.  He also reports small-volume stools describes what sounds like mild constipation.  Symptoms improving over the last several weeks.  No rectal bleeding, fever, or chills reported.    No upper GI complaints.  Continues on omeprazole 20 mg once daily.    Past Medical History:   Diagnosis Date   • Awareness under anesthesia     DURING COLONSCOPY   • Mosley esophagus 2019   • Cancer (HCC) 2011   • Diverticulitis of colon 2019   • Hernia 2013    Inguinal hernia, repaired in 2013   • History of prostate cancer 2011   • History of syncope 2006   • Renal cyst, right 12/2021       Past Surgical History:   Procedure Laterality Date   • COLONOSCOPY  2012    polyps per pt   • COLONOSCOPY N/A 7/30/2019    tics, NBIH, TA x 2, HP x 1   • ENDOSCOPY N/A 7/30/2019    Z line irregular, 39-41 cm from incisors, Chronic H pylori gastritis, Mosley's esophagus   • INGUINAL HERNIA REPAIR Right 2013   • PROSTATECTOMY  2011   • UPPER GASTROINTESTINAL ENDOSCOPY  7/30/2019       Scheduled Meds:     Continuous Infusions: No current facility-administered medications for this visit.      PRN Meds:     No Known Allergies    Social History     Socioeconomic History   • Marital status: Single   Tobacco Use   • Smoking status: Never Smoker   • Smokeless tobacco: Never Used   Substance and Sexual Activity   •  Alcohol use: Yes     Alcohol/week: 13.0 standard drinks     Types: 6 Glasses of wine, 3 Cans of beer, 4 Standard drinks or equivalent per week     Comment: 1 drink per day   • Drug use: No   • Sexual activity: Not Currently     Partners: Female     Birth control/protection: None       Family History   Problem Relation Age of Onset   • Neuropathy Mother    • Alzheimer's disease Father    • Lung cancer Father    • Colon polyps Father    • Breast cancer Sister    • Macular degeneration Sister    • Malig Hyperthermia Neg Hx        Review of Systems   Constitutional: Negative for activity change, appetite change, fatigue, fever and unexpected weight change.   HENT: Negative for trouble swallowing.    Respiratory: Negative for apnea, cough, choking, chest tightness, shortness of breath and wheezing.    Cardiovascular: Negative for chest pain, palpitations and leg swelling.   Gastrointestinal: Positive for abdominal pain. Negative for abdominal distention, anal bleeding, blood in stool, constipation, diarrhea, nausea, rectal pain and vomiting.       Vitals:    01/31/22 1017   Temp: 96.8 °F (36 °C)       Physical Exam  Constitutional:       Appearance: He is well-developed.   Abdominal:      General: Bowel sounds are normal. There is no distension.      Palpations: Abdomen is soft. There is no mass.      Tenderness: There is no abdominal tenderness. There is no guarding.      Hernia: No hernia is present.   Skin:     General: Skin is warm and dry.      Capillary Refill: Capillary refill takes less than 2 seconds.   Neurological:      Mental Status: He is alert and oriented to person, place, and time.   Psychiatric:         Behavior: Behavior normal.       Assessment    Diagnoses and all orders for this visit:    1. LUQ pain (Primary)    2. Change in bowel habits    3. Polyp of colon, unspecified part of colon, unspecified type    4. Mosley's esophagus without dysplasia    5. Gastroesophageal reflux disease, unspecified  whether esophagitis present       Plan    Pleasant 71-year-old male seen in follow-up for complaints of left lower quadrant abdominal pain improving over the last couple weeks with change in bowel habits and descriptions of what sounds like mild constipation.  CT reassuring.  Physical exam unremarkable.     Begin a fiber supplement.  Benefiber, Citrucel or Metamucil is acceptable.  Fiber gummies are also acceptable.  Please take 1 dose of fiber in the morning and 1 in the evening for 4 weeks and increase to 2 doses of fiber in the morning and 2 in the evening after that.     Discussed the benefits of IBgard, educational pamphlet provided to the patient.    Recommend moving up bidirectional endoscopic examination to next available .    Patient met with scheduling today.    He is prescheduled for an appointment with Dr. Mills next week and would not like to cancel.    I spent 30 minutes caring for Best on this date of service. This time includes time spent by me in the following activities: preparing for the visit, reviewing tests, obtaining and/or reviewing a separately obtained history, performing a medically appropriate examination and/or evaluation , counseling and educating the patient/family/caregiver, ordering medications, tests, or procedures, documenting information in the medical record, independently interpreting results and communicating that information with the patient/family/caregiver and care coordination.         GEETHA Katz  [facility]  [address] [place] [address]  Office: [phone]

## 2022-02-08 ENCOUNTER — OFFICE VISIT (OUTPATIENT)
Dept: GASTROENTEROLOGY | Facility: CLINIC | Age: 72
End: 2022-02-08

## 2022-02-08 ENCOUNTER — TELEPHONE (OUTPATIENT)
Dept: INTERNAL MEDICINE | Facility: CLINIC | Age: 72
End: 2022-02-08

## 2022-02-08 VITALS — BODY MASS INDEX: 27.76 KG/M2 | HEIGHT: 71 IN | WEIGHT: 198.3 LBS | TEMPERATURE: 96.5 F

## 2022-02-08 DIAGNOSIS — K22.70 BARRETT'S ESOPHAGUS WITHOUT DYSPLASIA: ICD-10-CM

## 2022-02-08 DIAGNOSIS — K63.5 POLYP OF COLON, UNSPECIFIED PART OF COLON, UNSPECIFIED TYPE: ICD-10-CM

## 2022-02-08 DIAGNOSIS — R19.4 CHANGE IN BOWEL HABITS: ICD-10-CM

## 2022-02-08 DIAGNOSIS — R10.32 LEFT LOWER QUADRANT ABDOMINAL PAIN: Primary | ICD-10-CM

## 2022-02-08 PROCEDURE — 99214 OFFICE O/P EST MOD 30 MIN: CPT | Performed by: INTERNAL MEDICINE

## 2022-02-08 NOTE — TELEPHONE ENCOUNTER
Patient saw Dr. Mills today and he seems to think the patient's issue is not GI related, Dr. Mills recommends the patient see a urologist and the patient is wanting to get a referral and some assistance in getting in ASAP, please call cell [phone].

## 2022-02-08 NOTE — PROGRESS NOTES
Chief Complaint   Patient presents with   • Abdominal Pain   • Constipation   • Diarrhea   • Fatigue   • Weight Loss     Subjective     HPI  Best Adam is a 71 y.o. male who presents today for follow up.  2 months of left pelvic pain worse with movement better at rest did not respond to muscle relaxer or NSAIDs  Has not seen a urologist  CAT scan reportedly normal  History of colon polyps  History of Mosley's esophagus  GERD well controlled with omeprazole    Objective   Vitals:    02/08/22 0833   Temp: 96.5 °F (35.8 °C)       Physical Exam  Vitals reviewed.   Constitutional:       Appearance: He is well-developed.   HENT:      Head: Normocephalic and atraumatic.   Abdominal:      General: Bowel sounds are normal. There is no distension.      Palpations: Abdomen is soft. There is no mass.      Tenderness: There is no abdominal tenderness.      Hernia: No hernia is present.   Skin:     General: Skin is warm and dry.   Neurological:      Mental Status: He is alert and oriented to person, place, and time.   Psychiatric:         Behavior: Behavior normal.         Thought Content: Thought content normal.         Judgment: Judgment normal.       The following data was reviewed by: Candelario Mills MD on 02/08/2022:  Common labs    Common Labsle 7/7/21 7/7/21 10/11/21 10/11/21 12/27/21    0952 0952 0853 0853    Glucose 96  96     BUN 17  21     Creatinine 0.91  0.87  1.10   eGFR Non  Am 83  87     eGFR African Am 100  106     Sodium 140  142     Potassium 4.6  4.5     Chloride 106  106     Calcium 9.4  9.0     Total Protein 6.7  6.3     Albumin 4.60  4.50     Total Bilirubin 0.8  0.6     Alkaline Phosphatase 79  79     AST (SGOT) 23  24     ALT (SGPT) 31  33     Total Cholesterol  144  120    Triglycerides  49  52    HDL Cholesterol  53  51    LDL Cholesterol   80  57       Comments are available for some flowsheets but are not being displayed.           CMP    CMP 7/7/21 10/11/21 12/27/21   Glucose 96 96     BUN 17 21    Creatinine 0.91 0.87 1.10   eGFR Non  Am 83 87    eGFR African Am 100 106    Sodium 140 142    Potassium 4.6 4.5    Chloride 106 106    Calcium 9.4 9.0    Total Protein 6.7 6.3    Albumin 4.60 4.50    Globulin 2.1 1.8    Total Bilirubin 0.8 0.6    Alkaline Phosphatase 79 79    AST (SGOT) 23 24    ALT (SGPT) 31 33       Comments are available for some flowsheets but are not being displayed.                 Lipid Panel    Lipid Panel 7/7/21 10/11/21   Total Cholesterol 144 120   Triglycerides 49 52   HDL Cholesterol 53 51   VLDL Cholesterol 11 12   LDL Cholesterol  80 57      Comments are available for some flowsheets but are not being displayed.           TSH    TSH 4/6/21 7/7/21 10/11/21   TSH 0.247 (A) 1.110 0.985   (A) Abnormal value            Electrolytes    Electrolytes 7/7/21 10/11/21   Sodium 140 142   Potassium 4.6 4.5   Chloride 106 106   Calcium 9.4 9.0           Renal Profile    Renal Profile 7/7/21 10/11/21 12/27/21   BUN 17 21    Creatinine 0.91 0.87 1.10   eGFR Non  Am 83 87    eGFR  Am 100 106       Comments are available for some flowsheets but are not being displayed.           BMP    BMP 7/7/21 10/11/21 12/27/21   BUN 17 21    Creatinine 0.91 0.87 1.10   Sodium 140 142    Potassium 4.6 4.5    Chloride 106 106    CO2 22.1 22.8    Calcium 9.4 9.0       Comments are available for some flowsheets but are not being displayed.           Data reviewed: Radiologic studies CAT scan below   Study Result    Narrative & Impression   CT OF THE ABDOMEN AND PELVIS WITH CONTRAST 12/27/2021     HISTORY: Left lower quadrant pain.     TECHNIQUE: Spiral images were obtained from the lung bases to the  symphysis pubis after intravenous and oral contrast.     FINDINGS: Small low-density cyst is seen in the left hepatic lobe on  image 37. Liver is otherwise unremarkable.     The gallbladder, spleen, pancreas, adrenals and left kidney appear  normal. At least 5 right renal cysts are  seen largest measuring 7.2 cm.      There is mild colonic diverticulosis.     Prostate gland has been removed. Urinary bladder is incompletely  distended.      No abdominal wall hernias are seen.     No masses or abnormal fluid collections are seen.     IMPRESSION:  1. Status post prostatectomy.  2. Small hepatic cyst and several right renal cysts.  3. Minimal colonic diverticulosis.  4. No acute process identified to explain patient's reported left lower  quadrant pain.           Assessment/Plan   Assessment:     Colon polyp  Mosley's esophagus  GERD  Left pelvic pain  Change in bowel      Plan:     Left pelvic pain he may want to consider referral for possible hernia versus urological evaluation  Schedule EGD and colonoscopy this month, we are moving it up from April per his request  Continue PPI    I spent 36 minutes caring for Best on this date of service. This time includes time spent by me in the following activities:preparing for the visit, reviewing tests, obtaining and/or reviewing a separately obtained history, performing a medically appropriate examination and/or evaluation , counseling and educating the patient/family/caregiver, ordering medications, tests, or procedures, referring and communicating with other health care professionals , documenting information in the medical record, independently interpreting results and communicating that information with the patient/family/caregiver and care coordination    Candelario Mills MD  [facility]  [address] [place] [address]  Office: [phone]

## 2022-02-17 ENCOUNTER — TELEPHONE (OUTPATIENT)
Dept: GASTROENTEROLOGY | Facility: CLINIC | Age: 72
End: 2022-02-17

## 2022-02-18 ENCOUNTER — LAB REQUISITION (OUTPATIENT)
Dept: LAB | Facility: HOSPITAL | Age: 72
End: 2022-02-18

## 2022-02-18 DIAGNOSIS — Z00.00 ENCOUNTER FOR GENERAL ADULT MEDICAL EXAMINATION WITHOUT ABNORMAL FINDINGS: ICD-10-CM

## 2022-02-18 LAB — SARS-COV-2 ORF1AB RESP QL NAA+PROBE: NOT DETECTED

## 2022-02-18 PROCEDURE — U0005 INFEC AGEN DETEC AMPLI PROBE: HCPCS | Performed by: INTERNAL MEDICINE

## 2022-02-18 PROCEDURE — U0004 COV-19 TEST NON-CDC HGH THRU: HCPCS | Performed by: INTERNAL MEDICINE

## 2022-02-23 ENCOUNTER — OUTSIDE FACILITY SERVICE (OUTPATIENT)
Dept: GASTROENTEROLOGY | Facility: CLINIC | Age: 72
End: 2022-02-23

## 2022-02-23 PROCEDURE — 45385 COLONOSCOPY W/LESION REMOVAL: CPT | Performed by: INTERNAL MEDICINE

## 2022-02-23 PROCEDURE — 43239 EGD BIOPSY SINGLE/MULTIPLE: CPT | Performed by: INTERNAL MEDICINE

## 2022-02-28 NOTE — PROGRESS NOTES
Mosley's esophagus no dysplasiaTubular adenoma colon polypEGD and colonoscopy recall 5 yearsOffice visit 1 year

## 2022-03-10 ENCOUNTER — TELEPHONE (OUTPATIENT)
Dept: INTERNAL MEDICINE | Facility: CLINIC | Age: 72
End: 2022-03-10

## 2022-03-10 DIAGNOSIS — E78.49 OTHER HYPERLIPIDEMIA: Chronic | ICD-10-CM

## 2022-03-10 RX ORDER — ATORVASTATIN CALCIUM 80 MG/1
80 TABLET, FILM COATED ORAL DAILY
Qty: 90 TABLET | Refills: 0 | Status: SHIPPED | OUTPATIENT
Start: 2022-03-10 | End: 2022-04-18 | Stop reason: SDUPTHER

## 2022-03-10 NOTE — TELEPHONE ENCOUNTER
Caller: Best Adam    Relationship: Self    Best call back number: [phone]       Requested Prescriptions:   Requested Prescriptions     Pending Prescriptions Disp Refills   • atorvastatin (LIPITOR) 80 MG tablet 90 tablet 3     Sig: Take 1 tablet by mouth Daily.        Pharmacy where request should be sent: [address] [phone] [facility] [phone] FX     Additional details provided by patient: PATIENT HAS A 5 DAY SUPPLY OF THIS MEDICATION      Does the patient have less than a 3 day supply:  [] Yes  [x] No    Bubba Cleary Rep   03/10/22 11:46 EST

## 2022-03-29 DIAGNOSIS — R10.12 LUQ PAIN: ICD-10-CM

## 2022-03-29 RX ORDER — OMEPRAZOLE 20 MG/1
40 CAPSULE, DELAYED RELEASE ORAL DAILY
Qty: 180 CAPSULE | Refills: 3 | Status: SHIPPED | OUTPATIENT
Start: 2022-03-29 | End: 2022-04-18 | Stop reason: SDUPTHER

## 2022-03-29 NOTE — TELEPHONE ENCOUNTER
Caller: Best Adam    Relationship: Self    Best call back number: [phone]    Requested Prescriptions:   Requested Prescriptions     Pending Prescriptions Disp Refills   • omeprazole (priLOSEC) 20 MG capsule 180 capsule 0     Sig: Take 2 capsules by mouth Daily.        Pharmacy where request should be sent: [address] [phone] [facility] [phone] FX     Additional details provided by patient: ASKING FOR 90 DAY SUPPLY    Does the patient have less than a 3 day supply:  [x] Yes  [] No    [facility], [facility] Rep   03/29/22 11:09 EDT

## 2022-04-18 ENCOUNTER — OFFICE VISIT (OUTPATIENT)
Dept: INTERNAL MEDICINE | Facility: CLINIC | Age: 72
End: 2022-04-18

## 2022-04-18 VITALS
HEIGHT: 71 IN | WEIGHT: 204 LBS | TEMPERATURE: 97.1 F | DIASTOLIC BLOOD PRESSURE: 74 MMHG | SYSTOLIC BLOOD PRESSURE: 118 MMHG | BODY MASS INDEX: 28.56 KG/M2

## 2022-04-18 DIAGNOSIS — E78.49 OTHER HYPERLIPIDEMIA: Chronic | ICD-10-CM

## 2022-04-18 DIAGNOSIS — F41.9 ANXIETY: ICD-10-CM

## 2022-04-18 DIAGNOSIS — I10 ESSENTIAL HYPERTENSION: Primary | Chronic | ICD-10-CM

## 2022-04-18 DIAGNOSIS — R10.32 LEFT GROIN PAIN: ICD-10-CM

## 2022-04-18 DIAGNOSIS — E03.8 OTHER SPECIFIED HYPOTHYROIDISM: Chronic | ICD-10-CM

## 2022-04-18 PROBLEM — I45.10 RBBB: Status: RESOLVED | Noted: 2019-05-07 | Resolved: 2022-04-18

## 2022-04-18 PROCEDURE — 99214 OFFICE O/P EST MOD 30 MIN: CPT | Performed by: INTERNAL MEDICINE

## 2022-04-18 RX ORDER — LOSARTAN POTASSIUM 100 MG/1
50 TABLET ORAL DAILY
Qty: 90 TABLET | Refills: 3 | Status: SHIPPED | OUTPATIENT
Start: 2022-04-18 | End: 2022-06-13

## 2022-04-18 RX ORDER — ATORVASTATIN CALCIUM 80 MG/1
80 TABLET, FILM COATED ORAL DAILY
Qty: 90 TABLET | Refills: 3 | Status: SHIPPED | OUTPATIENT
Start: 2022-04-18 | End: 2022-06-13

## 2022-04-18 RX ORDER — OMEPRAZOLE 20 MG/1
40 CAPSULE, DELAYED RELEASE ORAL DAILY
Qty: 180 CAPSULE | Refills: 3 | Status: SHIPPED | OUTPATIENT
Start: 2022-04-18 | End: 2023-03-30 | Stop reason: SDUPTHER

## 2022-04-18 NOTE — PROGRESS NOTES
Subjective        Chief Complaint   Patient presents with   • Hypothyroidism   • Hypertension           Best Adam is a 71 y.o. male who presents for    Patient Active Problem List   Diagnosis   • Other hyperlipidemia   • Essential hypertension   • Other specified hypothyroidism   • Anxiety   • Tubular adenoma   • Bilateral popliteal artery aneurysm (HCC)   • Raynaud's phenomenon without gangrene   • Mosley's esophagus without dysplasia       History of Present Illness     He had a cscope with Dr. Mills which showed a tubular adenoma and an EGD with Mosley's esophagus. He went back to see Dr. Salazar for his abdominal pain. He thought that he had a small hernia. He saw vascular and he had an USN of his popliteal aneurysms which are unchanged. He says his anxiety is doing well.   No Known Allergies    Current Outpatient Medications on File Prior to Visit   Medication Sig Dispense Refill   • cholecalciferol (VITAMIN D3) 1000 units tablet Take 1,000 Units by mouth Daily.     • escitalopram (LEXAPRO) 5 MG tablet Take 1 tablet by mouth Daily. 90 tablet 3   • levothyroxine (SYNTHROID, LEVOTHROID) 100 MCG tablet Take 1 tablet by mouth Daily. 90 tablet 1   • Multiple Vitamins-Minerals (OCUVITE ADULT 50+ PO) Take 1 tablet by mouth Daily.     • Omega-3 Fatty Acids (FISH OIL MAXIMUM STRENGTH) 1200 MG capsule delayed-release Take 1 tablet by mouth Daily.     • Unable to find Take 2 each by mouth Daily. FD Guard     • [DISCONTINUED] atorvastatin (LIPITOR) 80 MG tablet Take 1 tablet by mouth Daily. 90 tablet 0   • [DISCONTINUED] losartan (COZAAR) 100 MG tablet Take 0.5 tablets by mouth Daily. 1/2 tab po daily 30 tablet 5   • [DISCONTINUED] omeprazole (priLOSEC) 20 MG capsule Take 2 capsules by mouth Daily. 180 capsule 3     No current facility-administered medications on file prior to visit.       Past Medical History:   Diagnosis Date   • Awareness under anesthesia     DURING COLONSCOPY   • Mosley esophagus 2019   • Cancer  ([facility]) 2011   • COVID-19 01/2022   • Diverticulitis of colon 2019   • Hernia 2013    Inguinal hernia, repaired in 2013   • History of prostate cancer 2011   • History of syncope 2006   • RBBB 05/07/2019   • Renal cyst, right 12/2021   • Tubular adenoma 02/23/2022       Past Surgical History:   Procedure Laterality Date   • COLONOSCOPY  2012    polyps per pt   • COLONOSCOPY N/A 07/30/2019    tics, NBIH, TA x 2, HP x 1   • COLONOSCOPY  02/23/2022   • ENDOSCOPY N/A 07/30/2019    Z line irregular, 39-41 cm from incisors, Chronic H pylori gastritis, Mosley's esophagus   • ENDOSCOPY  02/23/2022   • INGUINAL HERNIA REPAIR Right 2013   • PROSTATECTOMY  2011   • UPPER GASTROINTESTINAL ENDOSCOPY  07/30/2019       Family History   Problem Relation Age of Onset   • Neuropathy Mother    • Alzheimer's disease Father    • Lung cancer Father    • Colon polyps Father    • Breast cancer Sister    • Macular degeneration Sister    • Malig Hyperthermia Neg Hx        Social History     Socioeconomic History   • Marital status: Single   Tobacco Use   • Smoking status: Never Smoker   • Smokeless tobacco: Never Used   Substance and Sexual Activity   • Alcohol use: Yes     Alcohol/week: 13.0 standard drinks     Types: 6 Glasses of wine, 3 Cans of beer, 4 Standard drinks or equivalent per week     Comment: 1 drink per day   • Drug use: No   • Sexual activity: Not Currently     Partners: Female     Birth control/protection: None           The following portions of the patient's history were reviewed and updated as appropriate: problem list, allergies, current medications, past medical history, past family history, past social history and past surgical history.    Review of Systems    Immunization History   Administered Date(s) Administered   • COVID-19 (PFIZER) PURPLE CAP 02/05/2021, 03/15/2021, 09/27/2021   • Fluzone High Dose =>65 Years ([employer] ONLY) 10/01/2018, 12/15/2019, 10/11/2021   • Pneumococcal Conjugate 13-Valent (PCV13) 06/25/2019  "  • Pneumococcal Polysaccharide (PPSV23) 06/30/2020       Objective   Vitals:    04/18/22 1251   BP: 118/74   Temp: 97.1 °F (36.2 °C)   Weight: 92.5 kg (204 lb)   Height: 180.3 cm (71\")     Body mass index is 28.45 kg/m².  Physical Exam  Vitals reviewed.   Constitutional:       Appearance: He is well-developed.   HENT:      Head: Normocephalic and atraumatic.   Cardiovascular:      Rate and Rhythm: Normal rate and regular rhythm.      Heart sounds: Normal heart sounds, S1 normal and S2 normal.   Pulmonary:      Effort: Pulmonary effort is normal.      Breath sounds: Normal breath sounds.   Abdominal:      Hernia: There is no hernia in the left inguinal area or right inguinal area.   Skin:     General: Skin is warm.   Neurological:      Mental Status: He is alert.   Psychiatric:         Behavior: Behavior normal.         Procedures    Assessment/Plan   Diagnoses and all orders for this visit:    1. Essential hypertension (Primary)  -     losartan (COZAAR) 100 MG tablet; Take 0.5 tablets by mouth Daily. 1/2 tab po daily  Dispense: 90 tablet; Refill: 3  -     Comprehensive Metabolic Panel; Future    2. Other hyperlipidemia  -     atorvastatin (LIPITOR) 80 MG tablet; Take 1 tablet by mouth Daily.  Dispense: 90 tablet; Refill: 3  -     Lipid Panel With / Chol / HDL Ratio; Future    3. Left groin pain  -     Ambulatory Referral to General Surgery    4. Other specified hypothyroidism  -     TSH; Future    5. Anxiety    Other orders  -     omeprazole (priLOSEC) 20 MG capsule; Take 2 capsules by mouth Daily.  Dispense: 180 capsule; Refill: 3               TSH is at goal. Discussed 4th COVID shot. Stable emotionally. Recc Tdap and Shingrix. I do not feel a hernia but I will have him be examined by surgeon. BP is good.  Return in about 6 months (around 10/18/2022) for Medicare Wellness, Lab Before FUP.  "

## 2022-04-29 DIAGNOSIS — E03.8 OTHER SPECIFIED HYPOTHYROIDISM: ICD-10-CM

## 2022-04-29 RX ORDER — LEVOTHYROXINE SODIUM 0.1 MG/1
100 TABLET ORAL DAILY
Qty: 90 TABLET | Refills: 3 | Status: SHIPPED | OUTPATIENT
Start: 2022-04-29

## 2022-05-02 ENCOUNTER — OFFICE VISIT (OUTPATIENT)
Dept: SURGERY | Facility: CLINIC | Age: 72
End: 2022-05-02

## 2022-05-02 VITALS — WEIGHT: 206 LBS | HEIGHT: 71 IN | BODY MASS INDEX: 28.84 KG/M2

## 2022-05-02 DIAGNOSIS — R10.32 LEFT GROIN PAIN: Primary | ICD-10-CM

## 2022-05-02 PROCEDURE — 99203 OFFICE O/P NEW LOW 30 MIN: CPT | Performed by: SURGERY

## 2022-05-02 NOTE — PROGRESS NOTES
Cc: Left groin discomfort    History of presenting illness:   This is a quite nice, generally healthy 71-year-old gentleman with a history of prostate cancer, status post robotic prostatectomy around 2011 who says that in December 2021 he had the sudden onset of fairly severe left lower quadrant or left groin pain.  This prompted a CT of the abdomen and pelvis which was essentially read as negative.  The symptoms have gradually improved, to the point where he now only has some mild discomfort in the left groin.  He has not noticed any associated bulge.  He is familiar with hernia symptoms, as he had an open right inguinal hernia repair done around 2 years after his robotic prostatectomy.  In general, at this time he is able to be as active as he would typically like to be which is some mild discomfort at the site.    Past Medical History: Prostate cancer, resolved, hypertension, hyperlipidemia, Mosley's disease of the esophagus, hypothyroidism    Past Surgical History: Significant for robotic prostatectomy 2011, open right inguinal hernia repair with mesh 2013, colonoscopy most recently February 2022    Medications: Atorvastatin, levothyroxine, losartan, omeprazole    Allergies: None known    Social History: Non-smoker    Family History: Negative for colorectal cancer    Review of Systems:  Constitutional: Negative for fever, chills, change in weight  Neck: no swollen glands or dysphagia or odynophagia  Respiratory: negative for SOB, cough, hemoptysis or wheezing  Cardiovascular: negative for chest pain, palpitations or peripheral edema  Gastrointestinal: Negative for nausea, vomiting, abdominal pain or change in bowel habits      Physical Exam:  BMI: 28.7  General: alert and oriented, appropriate, no acute distress  Eyes: No scleral icterus, extraocular movements are intact  Neck: Supple without lymphadenopathy or thyromegaly, trachea is in the midline  Respiratory: There is good bilateral chest expansion, no use  of accessory muscles is noted  Cardiovascular: No jugular venous distention or peripheral edema is seen  Gastrointestinal: Soft, benign, no ventral hernia felt, no mass, no significant tenderness  Genitourinary: No evidence of recurrent hernia seen on the right.  There is mild tenderness in the left groin, but there is no obvious left inguinal hernia felt    Laboratory data: No recent relevant data    Imaging data: CT of the abdomen pelvis from December 2021 reviewed by me.  I see no evidence of diverticulitis.  While I agree that there is no clear hernia seen, there does appear to be an asymmetric fatty concentration within the left inguinal canal consistent with at least lipoma of the cord.  I suspect that there is probably an associated small and difficult to appreciate indirect defect as well.      Assessment and plan:   -Left groin pain, mild and improving  -Possible left inguinal hernia, clinically difficult to detect, radiographically not entirely clear but suspicious  -Significant prior history of prostatectomy, rendering minimally invasive hernia repair challenging and potentially dangerous  -Given the fact that the patient's symptoms are improving, its not clear that they related to a hernia, and that it is entirely possible that open hernia repair would result in more pain that he is having now, I have recommended observation only.  The patient knows what a true hernia feels like, having experienced this on the right side, and if he feels something pop through, I think at that point an open left inguinal hernia repair would probably be wise.  For the time being, reasonable caution and observation is best.  Patient understands and is agreeable to proceed as outlined.      Harris Campos MD, FACS  General, Minimally Invasive and Endoscopic Surgery  [facility]    [address], [address]  [place], [place], [address]  P: [phone]  F: [phone]

## 2022-06-13 DIAGNOSIS — I10 ESSENTIAL HYPERTENSION: Chronic | ICD-10-CM

## 2022-06-13 DIAGNOSIS — E78.49 OTHER HYPERLIPIDEMIA: Chronic | ICD-10-CM

## 2022-06-13 RX ORDER — LOSARTAN POTASSIUM 100 MG/1
TABLET ORAL
Qty: 90 TABLET | Refills: 3 | Status: SHIPPED | OUTPATIENT
Start: 2022-06-13

## 2022-06-13 RX ORDER — ATORVASTATIN CALCIUM 80 MG/1
TABLET, FILM COATED ORAL
Qty: 90 TABLET | Refills: 3 | Status: SHIPPED | OUTPATIENT
Start: 2022-06-13

## 2022-07-07 RX ORDER — ESCITALOPRAM OXALATE 5 MG/1
5 TABLET ORAL DAILY
Qty: 90 TABLET | Refills: 3 | Status: SHIPPED | OUTPATIENT
Start: 2022-07-07

## 2022-07-07 NOTE — TELEPHONE ENCOUNTER
Caller: Best Adam    Relationship: Self    Best call back number: [phone]    Requested Prescriptions:   Requested Prescriptions     Pending Prescriptions Disp Refills   • escitalopram (LEXAPRO) 5 MG tablet 90 tablet 3     Sig: Take 1 tablet by mouth Daily.        Pharmacy where request should be sent: [address] [phone] [facility] [phone] FX     Additional details provided by patient: PATIENT HAS FOUR DAYS LEFT OF MEDICATION. PATIENT IS REQUESTING A 90 DAY SUPPLY. PLEASE ADVISE.    Does the patient have less than a 3 day supply:  [] Yes  [x] No    Bubba Gruber Rep   07/07/22 10:57 EDT

## 2022-10-27 PROBLEM — K63.5 POLYP OF COLON: Status: ACTIVE | Noted: 2022-01-17

## 2022-10-31 ENCOUNTER — OFFICE VISIT (OUTPATIENT)
Dept: INTERNAL MEDICINE | Facility: CLINIC | Age: 72
End: 2022-10-31

## 2022-10-31 VITALS
SYSTOLIC BLOOD PRESSURE: 116 MMHG | DIASTOLIC BLOOD PRESSURE: 78 MMHG | TEMPERATURE: 96.8 F | HEIGHT: 71 IN | RESPIRATION RATE: 18 BRPM | WEIGHT: 204 LBS | BODY MASS INDEX: 28.56 KG/M2

## 2022-10-31 DIAGNOSIS — I10 ESSENTIAL HYPERTENSION: Chronic | ICD-10-CM

## 2022-10-31 DIAGNOSIS — F41.9 ANXIETY: ICD-10-CM

## 2022-10-31 DIAGNOSIS — Z00.00 ENCOUNTER FOR SUBSEQUENT ANNUAL WELLNESS VISIT (AWV) IN MEDICARE PATIENT: Primary | ICD-10-CM

## 2022-10-31 DIAGNOSIS — E03.8 OTHER SPECIFIED HYPOTHYROIDISM: Chronic | ICD-10-CM

## 2022-10-31 DIAGNOSIS — E78.49 OTHER HYPERLIPIDEMIA: Chronic | ICD-10-CM

## 2022-10-31 PROCEDURE — 1159F MED LIST DOCD IN RCRD: CPT | Performed by: INTERNAL MEDICINE

## 2022-10-31 PROCEDURE — 1160F RVW MEDS BY RX/DR IN RCRD: CPT | Performed by: INTERNAL MEDICINE

## 2022-10-31 PROCEDURE — 1126F AMNT PAIN NOTED NONE PRSNT: CPT | Performed by: INTERNAL MEDICINE

## 2022-10-31 PROCEDURE — G0439 PPPS, SUBSEQ VISIT: HCPCS | Performed by: INTERNAL MEDICINE

## 2022-10-31 PROCEDURE — 1170F FXNL STATUS ASSESSED: CPT | Performed by: INTERNAL MEDICINE

## 2022-10-31 RX ORDER — BUSPIRONE HYDROCHLORIDE 5 MG/1
5 TABLET ORAL 3 TIMES DAILY PRN
Qty: 60 TABLET | Refills: 1 | Status: SHIPPED | OUTPATIENT
Start: 2022-10-31

## 2022-10-31 NOTE — PROGRESS NOTES
The ABCs of the Annual Wellness Visit  Subsequent Medicare Wellness Visit    Chief Complaint   Patient presents with   • Medicare Wellness-subsequent   • Hyperlipidemia   • Hypertension   • Hypothyroidism      Subjective    History of Present Illness:  Best Adam is a 71 y.o. male who presents for a Subsequent Medicare Wellness Visit.  The pain is in his abdomen has greatly improved. He says his anxiety is doing well. He only gets nervous about driving across bridges. He denies chest pain or dyspnea. He goes to the gym five days per week.   The following portions of the patient's history were reviewed and   updated as appropriate: allergies, current medications, past family history, past medical history, past social history, past surgical history and problem list.    Compared to one year ago, the patient feels his physical   health is the same.    Compared to one year ago, the patient feels his mental   health is the same.    Recent Hospitalizations:  He was not admitted to the hospital during the last year.       Current Medical Providers:  Patient Care Team:  Tim Kim MD as PCP - General (Internal Medicine)    Outpatient Medications Prior to Visit   Medication Sig Dispense Refill   • atorvastatin (LIPITOR) 80 MG tablet Take 1 tablet by mouth once daily 90 tablet 3   • cholecalciferol (VITAMIN D3) 1000 units tablet Take 1,000 Units by mouth Daily.     • escitalopram (LEXAPRO) 5 MG tablet Take 1 tablet by mouth Daily. 90 tablet 3   • levothyroxine (SYNTHROID, LEVOTHROID) 100 MCG tablet Take 1 tablet by mouth Daily. 90 tablet 3   • losartan (COZAAR) 100 MG tablet Take 1/2 (one-half) tablet by mouth once daily 90 tablet 3   • Multiple Vitamins-Minerals (OCUVITE ADULT 50+ PO) Take 1 tablet by mouth Daily.     • Omega-3 Fatty Acids (FISH OIL MAXIMUM STRENGTH) 1200 MG capsule delayed-release Take 1 tablet by mouth Daily.     • omeprazole (priLOSEC) 20 MG capsule Take 2 capsules by mouth Daily. 180 capsule  "3   • Unable to find Take 2 each by mouth Daily. FD Guard       No facility-administered medications prior to visit.       No opioid medication identified on active medication list. I have reviewed chart for other potential  high risk medication/s and harmful drug interactions in the elderly.          Aspirin is not on active medication list.  Aspirin use is not indicated based on review of current medical condition/s. Risk of harm outweighs potential benefits.  .    Patient Active Problem List   Diagnosis   • Other hyperlipidemia   • Essential hypertension   • Other specified hypothyroidism   • Anxiety   • Tubular adenoma   • Bilateral popliteal artery aneurysm (HCC)   • Raynaud's phenomenon without gangrene   • Mosley's esophagus without dysplasia     Advance Care Planning  Advance Directive is not on file.  ACP discussion was held with the patient during this visit. Patient has an advance directive (not in EMR), copy requested.          Objective    Vitals:    10/31/22 1326   BP: 116/78   Resp: 18   Temp: 96.8 °F (36 °C)   TempSrc: Temporal   Weight: 92.5 kg (204 lb)   Height: 180.3 cm (71\")   PainSc: 0-No pain     Estimated body mass index is 28.45 kg/m² as calculated from the following:    Height as of this encounter: 180.3 cm (71\").    Weight as of this encounter: 92.5 kg (204 lb).    BMI is >= 25 and <30. (Overweight) The following options were offered after discussion;: exercise counseling/recommendations      Does the patient have evidence of cognitive impairment? No    Physical Exam  Vitals reviewed.   Constitutional:       Appearance: He is well-developed.   HENT:      Head: Normocephalic and atraumatic.   Cardiovascular:      Rate and Rhythm: Normal rate and regular rhythm.      Heart sounds: Normal heart sounds, S1 normal and S2 normal.   Pulmonary:      Effort: Pulmonary effort is normal.      Breath sounds: Normal breath sounds.   Skin:     General: Skin is warm.   Neurological:      Mental Status: " He is alert.   Psychiatric:         Behavior: Behavior normal.       Lab Results   Component Value Date    CHLPL 138 10/12/2022    TRIG 50 10/12/2022    HDL 59 10/12/2022    LDL 68 10/12/2022    VLDL 11 10/12/2022            HEALTH RISK ASSESSMENT    Smoking Status:  Social History     Tobacco Use   Smoking Status Never   Smokeless Tobacco Never     Alcohol Consumption:  Social History     Substance and Sexual Activity   Alcohol Use Yes   • Alcohol/week: 11.0 standard drinks   • Types: 6 Glasses of wine, 3 Cans of beer, 2 Shots of liquor per week    Comment: 1 drink per day     Fall Risk Screen:    STEADI Fall Risk Assessment was completed, and patient is at LOW risk for falls.Assessment completed on:10/31/2022    Depression Screening:  PHQ-2/PHQ-9 Depression Screening 10/31/2022   Retired PHQ-9 Total Score -   Retired Total Score -   Little Interest or Pleasure in Doing Things 0-->not at all   Feeling Down, Depressed or Hopeless 0-->not at all   PHQ-9: Brief Depression Severity Measure Score 0       Health Habits and Functional and Cognitive Screening:  Functional & Cognitive Status 10/31/2022   Do you have difficulty preparing food and eating? No   Do you have difficulty bathing yourself, getting dressed or grooming yourself? No   Do you have difficulty using the toilet? No   Do you have difficulty moving around from place to place? No   Do you have trouble with steps or getting out of a bed or a chair? No   Current Diet Well Balanced Diet   Dental Exam Up to date        Dental Exam Comment -   Eye Exam Up to date   Exercise (times per week) 5 times per week   Current Exercises Include Home Fitness Gym;Walking   Current Exercise Activities Include -   Do you need help using the phone?  No   Are you deaf or do you have serious difficulty hearing?  No   Do you need help with transportation? No   Do you need help shopping? No   Do you need help preparing meals?  No   Do you need help with housework?  No   Do you need  help with laundry? No   Do you need help taking your medications? No   Do you need help managing money? No   Do you ever drive or ride in a car without wearing a seat belt? No   Have you felt unusual stress, anger or loneliness in the last month? No   Who do you live with? Alone   If you need help, do you have trouble finding someone available to you? No   Have you been bothered in the last four weeks by sexual problems? No   Do you have difficulty concentrating, remembering or making decisions? No       Age-appropriate Screening Schedule:  Refer to the list below for future screening recommendations based on patient's age, sex and/or medical conditions. Orders for these recommended tests are listed in the plan section. The patient has been provided with a written plan.    Health Maintenance   Topic Date Due   • TDAP/TD VACCINES (1 - Tdap) Never done   • ZOSTER VACCINE (1 of 2) Never done   • LIPID PANEL  10/12/2023   • INFLUENZA VACCINE  Completed              Assessment & Plan   CMS Preventative Services Quick Reference  Risk Factors Identified During Encounter  Immunizations Discussed/Encouraged (specific Immunizations; Tdap and Shingrix  The above risks/problems have been discussed with the patient.  Follow up actions/plans if indicated are seen below in the Assessment/Plan Section.  Pertinent information has been shared with the patient in the After Visit Summary.    Diagnoses and all orders for this visit:    1. Encounter for subsequent annual wellness visit (AWV) in Medicare patient (Primary)    2. Anxiety  Comments:  Asked for Xanax. He can try buspar for bridges and long road trips  Orders:  -     busPIRone (BUSPAR) 5 MG tablet; Take 1 tablet by mouth 3 (Three) Times a Day As Needed (anxiety).  Dispense: 60 tablet; Refill: 1    3. Other specified hypothyroidism  -     TSH; Future    4. Essential hypertension    5. Other hyperlipidemia        Follow Up:   Return in about 6 months (around 4/30/2023), or 30  minutes, for Lab Before FUP.     An After Visit Summary and PPPS were made available to the patient.                   Reviewed labs. TSH and LDL are at goal. BP is good. Anxiety is stable.

## 2022-11-01 PROBLEM — K63.5 POLYP OF COLON: Status: ACTIVE | Noted: 2022-01-17

## 2023-03-30 RX ORDER — OMEPRAZOLE 20 MG/1
40 CAPSULE, DELAYED RELEASE ORAL DAILY
Qty: 180 CAPSULE | Refills: 3 | Status: SHIPPED | OUTPATIENT
Start: 2023-03-30

## 2023-03-30 NOTE — TELEPHONE ENCOUNTER
Caller: Best Adam    Relationship: Self    Best call back number: [phone]     Requested Prescriptions:   Requested Prescriptions     Pending Prescriptions Disp Refills   • omeprazole (priLOSEC) 20 MG capsule 180 capsule 3     Sig: Take 2 capsules by mouth Daily.        Pharmacy where request should be sent: [address] [phone] [facility] [phone] FX     Last office visit with prescribing clinician: 10/31/2022   Last telemedicine visit with prescribing clinician: 4/24/2023   Next office visit with prescribing clinician: 5/1/2023     Additional details provided by patient: PATIENT HAS A 2 DAY SUPPLY     Does the patient have less than a 3 day supply:  [x] Yes  [] No    Would you like a call back once the refill request has been completed: [x] Yes [] No    If the office needs to give you a call back, can they leave a voicemail: [x] Yes [] No    Bubba Gayle Rep   03/30/23 13:23 EDT

## 2023-04-20 DIAGNOSIS — E03.8 OTHER SPECIFIED HYPOTHYROIDISM: ICD-10-CM

## 2023-04-20 RX ORDER — LEVOTHYROXINE SODIUM 0.1 MG/1
100 TABLET ORAL DAILY
Qty: 90 TABLET | Refills: 3 | Status: SHIPPED | OUTPATIENT
Start: 2023-04-20

## 2023-04-20 NOTE — TELEPHONE ENCOUNTER
DELETE AFTER REVIEWING: Send the encounter HIGH priority, If patient has less than a 3 day supply. If the patient will run out of medication over the weekend add that information to the additional details line. Send this encounter to the clinical pool.    Caller: Best Adam    Relationship: Self    Best call back number: [phone]  Requested Prescriptions:   Requested Prescriptions     Pending Prescriptions Disp Refills   • levothyroxine (SYNTHROID, LEVOTHROID) 100 MCG tablet 90 tablet 3     Sig: Take 1 tablet by mouth Daily.        Pharmacy where request should be sent: [address] [phone] [facility] [phone] FX     Last office visit with prescribing clinician: 10/31/2022   Last telemedicine visit with prescribing clinician: 4/24/2023   Next office visit with prescribing clinician: 5/1/2023     Additional details provided by patient: PLEASE FILL FOR 90 TABLETS   Does the patient have less than a 3 day supply:  [x] Yes  [] No    Would you like a call back once the refill request has been completed: [x] Yes [] No    If the office needs to give you a call back, can they leave a voicemail: [x] Yes [] No    Bubba Rivera Rep   04/20/23 13:23 EDT

## 2023-04-24 DIAGNOSIS — E03.8 OTHER SPECIFIED HYPOTHYROIDISM: Chronic | ICD-10-CM

## 2023-04-25 LAB — TSH SERPL DL<=0.005 MIU/L-ACNC: 0.69 UIU/ML (ref 0.45–4.5)

## 2023-05-01 ENCOUNTER — OFFICE VISIT (OUTPATIENT)
Dept: INTERNAL MEDICINE | Facility: CLINIC | Age: 73
End: 2023-05-01
Payer: MEDICARE

## 2023-05-01 VITALS
SYSTOLIC BLOOD PRESSURE: 116 MMHG | BODY MASS INDEX: 29.88 KG/M2 | WEIGHT: 213.4 LBS | HEIGHT: 71 IN | DIASTOLIC BLOOD PRESSURE: 78 MMHG | TEMPERATURE: 96.8 F

## 2023-05-01 DIAGNOSIS — F41.9 ANXIETY: ICD-10-CM

## 2023-05-01 DIAGNOSIS — E03.8 OTHER SPECIFIED HYPOTHYROIDISM: Primary | ICD-10-CM

## 2023-05-01 DIAGNOSIS — Z85.46 H/O PROSTATE CANCER: ICD-10-CM

## 2023-05-01 DIAGNOSIS — I10 ESSENTIAL HYPERTENSION: ICD-10-CM

## 2023-05-01 DIAGNOSIS — E78.49 OTHER HYPERLIPIDEMIA: ICD-10-CM

## 2023-05-01 PROCEDURE — 99214 OFFICE O/P EST MOD 30 MIN: CPT | Performed by: INTERNAL MEDICINE

## 2023-05-01 PROCEDURE — 3078F DIAST BP <80 MM HG: CPT | Performed by: INTERNAL MEDICINE

## 2023-05-01 PROCEDURE — 3074F SYST BP LT 130 MM HG: CPT | Performed by: INTERNAL MEDICINE

## 2023-05-01 PROCEDURE — 1159F MED LIST DOCD IN RCRD: CPT | Performed by: INTERNAL MEDICINE

## 2023-05-01 PROCEDURE — 1160F RVW MEDS BY RX/DR IN RCRD: CPT | Performed by: INTERNAL MEDICINE

## 2023-05-01 RX ORDER — BUSPIRONE HYDROCHLORIDE 5 MG/1
5 TABLET ORAL 3 TIMES DAILY PRN
Qty: 60 TABLET | Refills: 4 | Status: SHIPPED | OUTPATIENT
Start: 2023-05-01

## 2023-05-01 NOTE — PROGRESS NOTES
Subjective        Chief Complaint   Patient presents with   • Hypertension           Best Adam is a 72 y.o. male who presents for    Patient Active Problem List   Diagnosis   • Other hyperlipidemia   • Essential hypertension   • Other specified hypothyroidism   • Anxiety   • Tubular adenoma   • Bilateral popliteal artery aneurysm   • Raynaud's phenomenon without gangrene   • Mosley's esophagus without dysplasia       History of Present Illness     His abdominal pain resolved.He has not checked his BP. He saw Dr. Gray and he had an USN of his popliteal arteries. He has not had toe discoloration this winter. He has been stable emotionally. He is going to spend the next 6 months out of town. He is headed to [place] for 2 weeks. He denies chest pain, dyspnea, melena or hematochezia. He exercises regularly.  No Known Allergies    Current Outpatient Medications on File Prior to Visit   Medication Sig Dispense Refill   • atorvastatin (LIPITOR) 80 MG tablet Take 1 tablet by mouth once daily 90 tablet 3   • cholecalciferol (VITAMIN D3) 1000 units tablet Take 1 tablet by mouth Daily.     • escitalopram (LEXAPRO) 5 MG tablet Take 1 tablet by mouth Daily. 90 tablet 3   • levothyroxine (SYNTHROID, LEVOTHROID) 100 MCG tablet Take 1 tablet by mouth Daily. 90 tablet 3   • losartan (COZAAR) 100 MG tablet Take 1/2 (one-half) tablet by mouth once daily 90 tablet 3   • Multiple Vitamins-Minerals (OCUVITE ADULT 50+ PO) Take 1 tablet by mouth Daily.     • Omega-3 Fatty Acids (FISH OIL MAXIMUM STRENGTH) 1200 MG capsule delayed-release Take 1 tablet by mouth Daily.     • omeprazole (priLOSEC) 20 MG capsule Take 2 capsules by mouth Daily. 180 capsule 3   • Unable to find Take 2 each by mouth Daily. FD Guard     • [DISCONTINUED] busPIRone (BUSPAR) 5 MG tablet Take 1 tablet by mouth 3 (Three) Times a Day As Needed (anxiety). 60 tablet 1     No current facility-administered medications on file prior to visit.       Past Medical History:    Diagnosis Date   • Mosley esophagus 2019   • COVID-19 01/2022   • Diverticulitis of colon 2019   • Hernia 2013    Inguinal hernia, repaired in 2013   • History of prostate cancer 2011   • History of syncope 2006   • RBBB 05/07/2019   • Renal cyst, right 12/2021   • Tubular adenoma 02/23/2022       Past Surgical History:   Procedure Laterality Date   • COLONOSCOPY  2012   • COLONOSCOPY N/A 07/30/2019    tics, NBIH, TA x 2, HP x 1   • COLONOSCOPY  02/23/2022   • ENDOSCOPY N/A 07/30/2019    Z line irregular, 39-41 cm from incisors, Chronic H pylori gastritis, Mosley's esophagus   • ENDOSCOPY  02/23/2022   • INGUINAL HERNIA REPAIR Right 2013   • PROSTATECTOMY  2011   • UPPER GASTROINTESTINAL ENDOSCOPY  07/30/2019       Family History   Problem Relation Age of Onset   • Neuropathy Mother    • Alzheimer's disease Father    • Lung cancer Father    • Colon polyps Father    • Breast cancer Sister    • Arthritis Sister    • Hearing loss Sister    • Macular degeneration Sister    • Cancer Sister    • Malig Hyperthermia Neg Hx        Social History     Socioeconomic History   • Marital status: Single   Tobacco Use   • Smoking status: Never   • Smokeless tobacco: Never   Vaping Use   • Vaping Use: Never used   Substance and Sexual Activity   • Alcohol use: Yes     Alcohol/week: 11.0 standard drinks     Types: 6 Glasses of wine, 3 Cans of beer, 2 Shots of liquor per week     Comment: 1 drink per day   • Drug use: No   • Sexual activity: Not Currently     Partners: Female     Birth control/protection: None           The following portions of the patient's history were reviewed and updated as appropriate: problem list, allergies, current medications, past medical history, past family history, past social history and past surgical history.    Review of Systems    Immunization History   Administered Date(s) Administered   • COVID-19 (PFIZER) BIVALENT 12+YRS 12/14/2022   • COVID-19 (PFIZER) Purple Cap Monovalent 02/05/2021,  "03/15/2021, 09/27/2021   • Covid-19 (Pfizer) Gray Cap Monovalent 07/14/2022   • Fluzone High Dose =>65 Years ([employer]ax[employer] ONLY) 10/01/2018, 12/15/2019, 10/11/2021   • Fluzone High-Dose 65+yrs 10/19/2022   • Pneumococcal Conjugate 13-Valent (PCV13) 06/25/2019   • Pneumococcal Polysaccharide (PPSV23) 06/30/2020   • Shingrix 11/16/2022, 02/15/2023       Objective   Vitals:    05/01/23 1252   BP: 116/78   Temp: 96.8 °F (36 °C)   Weight: 96.8 kg (213 lb 6.4 oz)   Height: 180.3 cm (70.98\")     Body mass index is 29.78 kg/m².  Physical Exam  Vitals reviewed.   Constitutional:       Appearance: He is well-developed.   HENT:      Head: Normocephalic and atraumatic.   Cardiovascular:      Rate and Rhythm: Normal rate and regular rhythm.      Heart sounds: Normal heart sounds, S1 normal and S2 normal.   Pulmonary:      Effort: Pulmonary effort is normal.      Breath sounds: Normal breath sounds.   Skin:     General: Skin is warm.      Comments: On left lower back is stuck on brown papule c/w SK   Neurological:      Mental Status: He is alert.   Psychiatric:         Behavior: Behavior normal.         Procedures    Assessment & Plan   Diagnoses and all orders for this visit:    1. Other specified hypothyroidism (Primary)  -     TSH; Future    2. Anxiety    Orders:  -     busPIRone (BUSPAR) 5 MG tablet; Take 1 tablet by mouth 3 (Three) Times a Day As Needed (anxiety).  Dispense: 60 tablet; Refill: 4    3. Essential hypertension  -     Comprehensive Metabolic Panel; Future    4. Other hyperlipidemia  -     Lipid Panel With / Chol / HDL Ratio; Future    5. H/O prostate cancer  -     PSA DIAGNOSTIC; Future             TSH is at goal. He is stable emotionally. Recc Tdap.    Return in about 6 months (around 11/1/2023) for Medicare Wellness, Lab Before FUP.  "

## 2023-06-15 DIAGNOSIS — E78.49 OTHER HYPERLIPIDEMIA: Chronic | ICD-10-CM

## 2023-06-15 RX ORDER — ATORVASTATIN CALCIUM 80 MG/1
80 TABLET, FILM COATED ORAL DAILY
Qty: 90 TABLET | Refills: 3 | Status: SHIPPED | OUTPATIENT
Start: 2023-06-15

## 2023-06-15 NOTE — TELEPHONE ENCOUNTER
Caller: Best Adam    Relationship: Self    Best call back number: [phone]    Requested Prescriptions:   Requested Prescriptions     Pending Prescriptions Disp Refills    atorvastatin (LIPITOR) 80 MG tablet 90 tablet 3     Sig: Take 1 tablet by mouth Daily.        Pharmacy where request should be sent: [address] [phone] [facility] [phone]     Last office visit with prescribing clinician: 5/1/2023   Last telemedicine visit with prescribing clinician: Visit date not found   Next office visit with prescribing clinician: 11/30/2023     Additional details provided by patient: PLEASE ADVISE PATIENT ASAP.     Does the patient have less than a 3 day supply:  [] Yes  [x] No    Would you like a call back once the refill request has been completed: [] Yes [x] No    If the office needs to give you a call back, can they leave a voicemail: [] Yes [x] No    Bubba Bletran Rep   06/15/23 09:39 EDT

## 2023-07-03 ENCOUNTER — TELEPHONE (OUTPATIENT)
Dept: INTERNAL MEDICINE | Facility: CLINIC | Age: 73
End: 2023-07-03

## 2023-07-03 NOTE — TELEPHONE ENCOUNTER
Caller: Best Adam    Relationship: Self    Best call back number: [phone]     What is the best time to reach you: ANYTIME    What was the call regarding: PATIENT STATED HE HE WILL BE OUT OF TOWN FOR A COUPLE MONTHS AND WILL NEED TO USE A DIFFERENT PHARMACY.    PATIENT STATED HE WOULD LIKE TO USE [facility] [id number] [place] - [address] [facility] [phone] [facility] [phone] FX     Is it okay if the provider responds through [facility]: YES  
.
Will use new pharm if pt needs new Rx  
Introduction Text (Please End With A Colon): Deferred:
Detail Level: Detailed

## 2023-07-27 RX ORDER — ESCITALOPRAM OXALATE 5 MG/1
5 TABLET ORAL DAILY
Qty: 90 TABLET | Refills: 3 | Status: SHIPPED | OUTPATIENT
Start: 2023-07-27

## 2023-07-27 NOTE — TELEPHONE ENCOUNTER
Caller: Best Adam    Relationship: Self    Best call back [phone]     Requested Prescriptions:   Requested Prescriptions     Pending Prescriptions Disp Refills    escitalopram (LEXAPRO) 5 MG tablet 90 tablet 3     Sig: Take 1 tablet by mouth Daily.        Pharmacy where request should be sent: [id number] - [place] - [address] [facility] [phone] [facility] [phone]      Last office visit with prescribing clinician: 5/1/2023   Last telemedicine visit with prescribing clinician: Visit date not found   Next office visit with prescribing clinician: 11/30/2023         Does the patient have less than a 3 day supply:  [] Yes  [x] No  Bubba Nguyen Rep   07/27/23 15:36 EDT

## 2023-08-16 DIAGNOSIS — F41.9 ANXIETY: ICD-10-CM

## 2023-08-16 RX ORDER — BUSPIRONE HYDROCHLORIDE 5 MG/1
5 TABLET ORAL 3 TIMES DAILY PRN
Qty: 60 TABLET | Refills: 4 | Status: SHIPPED | OUTPATIENT
Start: 2023-08-16 | End: 2023-08-17

## 2023-08-16 NOTE — TELEPHONE ENCOUNTER
Caller: Best Adam    Relationship: Self    Best call back number: [phone]    Requested Prescriptions:   Requested Prescriptions     Pending Prescriptions Disp Refills    busPIRone (BUSPAR) 5 MG tablet 60 tablet 4     Sig: Take 1 tablet by mouth 3 (Three) Times a Day As Needed (anxiety).        Pharmacy where request should be sent: [facility] [address] [place] - [address] [facility] [phone] [facility] [phone] FX     Last office visit with prescribing clinician: 5/1/2023   Last telemedicine visit with prescribing clinician: Visit date not found   Next office visit with prescribing clinician: 11/30/2023     Additional details provided by patient: PATIENT WOULD LIKE TO INCREASE THIS MEDICATION, GOING TO 10 MG. PATIENT WOULD LIKE THIS JUST FOR WHEN HE IS IN [place]. 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 PATIENT STATES THAT THIS WILL ONLY BE TEMPORARY. PATIENT STATES THAT IF DR. NELSON WOULD LIKE TO DISCUSS THIS, PLEASE CALL HIM.     Does the patient have less than a 3 day supply:  [] Yes  [x] No    Would you like a call back once the refill request has been completed: [] Yes [x] No    If the office needs to give you a call back, can they leave a voicemail: [] Yes [x] No    Bubba Beltran Rep   08/16/23 10:43 EDT

## 2023-08-17 DIAGNOSIS — F41.9 ANXIETY: Primary | ICD-10-CM

## 2023-08-17 RX ORDER — BUSPIRONE HYDROCHLORIDE 10 MG/1
10 TABLET ORAL 3 TIMES DAILY PRN
Qty: 90 TABLET | Refills: 2 | Status: SHIPPED | OUTPATIENT
Start: 2023-08-17

## 2023-09-12 DIAGNOSIS — I10 ESSENTIAL HYPERTENSION: Chronic | ICD-10-CM

## 2023-09-12 RX ORDER — LOSARTAN POTASSIUM 100 MG/1
50 TABLET ORAL DAILY
Qty: 90 TABLET | Refills: 3 | Status: SHIPPED | OUTPATIENT
Start: 2023-09-12

## 2023-09-12 NOTE — TELEPHONE ENCOUNTER
Caller: Best Adam    Relationship: Self    Requested Prescriptions:   Requested Prescriptions     Pending Prescriptions Disp Refills    losartan (COZAAR) 100 MG tablet 90 tablet 3     Sig: Take 0.5 tablets by mouth Daily.        Pharmacy where request should be sent: [facility] [address]ONZ[address] [place] - [address] [facility] [phone] [facility] [phone]      Last office visit with prescribing clinician: 2023   Last telemedicine visit with prescribing clinician: Visit date not found   Next office visit with prescribing clinician: 2023     Additional details provided by patient: PATIENT HAS 3 DAYS LEFT.    PATIENT STATES THIS PRESCRIPTION IS .     Does the patient have less than a 3 day supply:  [x] Yes  [] No    Would you like a call back once the refill request has been completed: [] Yes [x] No    If the office needs to give you a call back, can they leave a voicemail: [] Yes [x] No    Bubba Aguiar Rep   23 11:41 EDT

## 2023-09-20 DIAGNOSIS — E78.49 OTHER HYPERLIPIDEMIA: Chronic | ICD-10-CM

## 2023-09-20 RX ORDER — OMEPRAZOLE 20 MG/1
40 CAPSULE, DELAYED RELEASE ORAL DAILY
Qty: 180 CAPSULE | Refills: 3 | Status: SHIPPED | OUTPATIENT
Start: 2023-09-20

## 2023-09-20 RX ORDER — ATORVASTATIN CALCIUM 80 MG/1
80 TABLET, FILM COATED ORAL DAILY
Qty: 90 TABLET | Refills: 3 | Status: SHIPPED | OUTPATIENT
Start: 2023-09-20

## 2023-09-20 NOTE — TELEPHONE ENCOUNTER
PATIENT CALLED FOR MEDICATION REFILL OF    atorvastatin (LIPITOR) 80 MG tablet     omeprazole (priLOSEC) 20 MG capsule     HE WILL BE OUT OVER THE WEEKEND. HE WOULD LIKE TO  MONDAY.    [id number] - [place] - [address] [facility] - [phone]  - [phone]  [phone]     CALL BACK NUMBER [phone]

## 2023-11-30 ENCOUNTER — OFFICE VISIT (OUTPATIENT)
Dept: INTERNAL MEDICINE | Facility: CLINIC | Age: 73
End: 2023-11-30
Payer: MEDICARE

## 2023-11-30 VITALS
BODY MASS INDEX: 29.76 KG/M2 | SYSTOLIC BLOOD PRESSURE: 140 MMHG | WEIGHT: 212.6 LBS | HEIGHT: 71 IN | DIASTOLIC BLOOD PRESSURE: 90 MMHG

## 2023-11-30 DIAGNOSIS — E03.8 OTHER SPECIFIED HYPOTHYROIDISM: Chronic | ICD-10-CM

## 2023-11-30 DIAGNOSIS — Z00.00 ENCOUNTER FOR SUBSEQUENT ANNUAL WELLNESS VISIT (AWV) IN MEDICARE PATIENT: Primary | ICD-10-CM

## 2023-11-30 DIAGNOSIS — Z23 NEED FOR INFLUENZA VACCINATION: ICD-10-CM

## 2023-11-30 DIAGNOSIS — F41.9 ANXIETY: ICD-10-CM

## 2023-11-30 DIAGNOSIS — I10 ESSENTIAL HYPERTENSION: Chronic | ICD-10-CM

## 2023-11-30 DIAGNOSIS — R06.83 SNORING: ICD-10-CM

## 2023-11-30 DIAGNOSIS — E78.49 OTHER HYPERLIPIDEMIA: Chronic | ICD-10-CM

## 2023-11-30 LAB
BASOPHILS # BLD AUTO: 0.04 10*3/MM3 (ref 0–0.2)
BASOPHILS NFR BLD AUTO: 0.7 % (ref 0–1.5)
EOSINOPHIL # BLD AUTO: 0.07 10*3/MM3 (ref 0–0.4)
EOSINOPHIL NFR BLD AUTO: 1.3 % (ref 0.3–6.2)
ERYTHROCYTE [DISTWIDTH] IN BLOOD BY AUTOMATED COUNT: 12.1 % (ref 12.3–15.4)
HCT VFR BLD AUTO: 45.4 % (ref 37.5–51)
HGB BLD-MCNC: 15.4 G/DL (ref 13–17.7)
IMM GRANULOCYTES # BLD AUTO: 0.02 10*3/MM3 (ref 0–0.05)
IMM GRANULOCYTES NFR BLD AUTO: 0.4 % (ref 0–0.5)
LYMPHOCYTES # BLD AUTO: 1.7 10*3/MM3 (ref 0.7–3.1)
LYMPHOCYTES NFR BLD AUTO: 31.1 % (ref 19.6–45.3)
MCH RBC QN AUTO: 29.6 PG (ref 26.6–33)
MCHC RBC AUTO-ENTMCNC: 33.9 G/DL (ref 31.5–35.7)
MCV RBC AUTO: 87.1 FL (ref 79–97)
MONOCYTES # BLD AUTO: 0.35 10*3/MM3 (ref 0.1–0.9)
MONOCYTES NFR BLD AUTO: 6.4 % (ref 5–12)
NEUTROPHILS # BLD AUTO: 3.29 10*3/MM3 (ref 1.7–7)
NEUTROPHILS NFR BLD AUTO: 60.1 % (ref 42.7–76)
NRBC BLD AUTO-RTO: 0 /100 WBC (ref 0–0.2)
PLATELET # BLD AUTO: 228 10*3/MM3 (ref 140–450)
RBC # BLD AUTO: 5.21 10*6/MM3 (ref 4.14–5.8)
WBC # BLD AUTO: 5.47 10*3/MM3 (ref 3.4–10.8)

## 2023-11-30 RX ORDER — TADALAFIL 5 MG/1
1 TABLET ORAL DAILY
COMMUNITY
Start: 2023-11-21

## 2023-11-30 RX ORDER — AMLODIPINE BESYLATE 5 MG/1
5 TABLET ORAL DAILY
Qty: 30 TABLET | Refills: 2 | Status: SHIPPED | OUTPATIENT
Start: 2023-11-30

## 2023-11-30 NOTE — PROGRESS NOTES
The ABCs of the Annual Wellness Visit  Subsequent Medicare Wellness Visit    Subjective    Best Adam is a 73 y.o. male who presents for a Subsequent Medicare Wellness Visit.  He is seeing Dr. Salazar and he was started on cialis. He denies chest pain. Overall his gut is better than 2 years ago. He is stable emotionally. He sees vascular once per year. He can be tired by the early afternoon. He will take a nap. He does snore.   The following portions of the patient's history were reviewed and   updated as appropriate: allergies, current medications, past family history, past medical history, past social history, past surgical history, and problem list.    Compared to one year ago, the patient feels his physical   health is the same.    Compared to one year ago, the patient feels his mental   health is the same.    Recent Hospitalizations:  He was not admitted to the hospital during the last year.       Current Medical Providers:  Patient Care Team:  Tim Kim MD as PCP - General (Internal Medicine)    Outpatient Medications Prior to Visit   Medication Sig Dispense Refill    atorvastatin (LIPITOR) 80 MG tablet Take 1 tablet by mouth Daily. 90 tablet 3    busPIRone (BUSPAR) 10 MG tablet Take 1 tablet by mouth 3 (Three) Times a Day As Needed (anxiety). 90 tablet 2    cholecalciferol (VITAMIN D3) 1000 units tablet Take 1 tablet by mouth Daily.      escitalopram (LEXAPRO) 5 MG tablet Take 1 tablet by mouth Daily. 90 tablet 3    levothyroxine (SYNTHROID, LEVOTHROID) 100 MCG tablet Take 1 tablet by mouth Daily. 90 tablet 3    losartan (COZAAR) 100 MG tablet Take 0.5 tablets by mouth Daily. 90 tablet 3    Multiple Vitamins-Minerals (OCUVITE ADULT 50+ PO) Take 1 tablet by mouth Daily.      omeprazole (priLOSEC) 20 MG capsule Take 2 capsules by mouth Daily. 180 capsule 3    tadalafil (CIALIS) 5 MG tablet Take 1 tablet by mouth Daily.      Unable to find Take 2 each by mouth Daily. FD Guard      Omega-3 Fatty Acids  "(FISH OIL MAXIMUM STRENGTH) 1200 MG capsule delayed-release Take 1 tablet by mouth Daily.       No facility-administered medications prior to visit.       No opioid medication identified on active medication list. I have reviewed chart for other potential  high risk medication/s and harmful drug interactions in the elderly.        Aspirin is not on active medication list.  Aspirin use is not indicated based on review of current medical condition/s. Risk of harm outweighs potential benefits.  .    Patient Active Problem List   Diagnosis    Other hyperlipidemia    Essential hypertension    Other specified hypothyroidism    Anxiety    Tubular adenoma    Bilateral popliteal artery aneurysm    Raynaud's phenomenon without gangrene    Mosley's esophagus without dysplasia     Advance Care Planning   Advance Care Planning     Advance Directive is not on file.  ACP discussion was held with the patient during this visit. Patient does not have an advance directive, information provided.     Objective    Vitals:    11/30/23 0949   BP: 140/90   Weight: 96.4 kg (212 lb 9.6 oz)   Height: 180.3 cm (70.98\")     Estimated body mass index is 29.67 kg/m² as calculated from the following:    Height as of this encounter: 180.3 cm (70.98\").    Weight as of this encounter: 96.4 kg (212 lb 9.6 oz).    BMI is >= 25 and <30. (Overweight) The following options were offered after discussion;: exercise counseling/recommendations      Does the patient have evidence of cognitive impairment? No    Lab Results   Component Value Date    CHLPL 142 11/27/2023    TRIG 70 11/27/2023    HDL 53 11/27/2023    LDL 75 11/27/2023    VLDL 14 11/27/2023        HEALTH RISK ASSESSMENT    Smoking Status:  Social History     Tobacco Use   Smoking Status Never   Smokeless Tobacco Never     Alcohol Consumption:  Social History     Substance and Sexual Activity   Alcohol Use Yes    Comment: 2 drinks per day     Fall Risk Screen:    BAUTISTA Fall Risk Assessment was " completed, and patient is at LOW risk for falls.Assessment completed on:2023    Depression Screenin/30/2023     9:51 AM   PHQ-2/PHQ-9 Depression Screening   Little Interest or Pleasure in Doing Things 0-->not at all   Feeling Down, Depressed or Hopeless 0-->not at all   PHQ-9: Brief Depression Severity Measure Score 0       Health Habits and Functional and Cognitive Screenin/30/2023     9:51 AM   Functional & Cognitive Status   Do you have difficulty preparing food and eating? No   Do you have difficulty bathing yourself, getting dressed or grooming yourself? No   Do you have difficulty using the toilet? No   Do you have difficulty moving around from place to place? No   Do you have trouble with steps or getting out of a bed or a chair? No   Current Diet Unhealthy Diet   Dental Exam Up to date   Eye Exam Up to date        Exercise Frequency Comment everyday   Current Exercises Include Walking;Cardiovascular Workout;Weightlifting   Do you need help using the phone?  No   Are you deaf or do you have serious difficulty hearing?  No   Do you need help to go to places out of walking distance? No   Do you need help shopping? No   Do you need help preparing meals?  No   Do you need help with housework?  No   Do you need help with laundry? No   Do you need help taking your medications? No   Do you need help managing money? No   Do you ever drive or ride in a car without wearing a seat belt? No   Have you felt unusual stress, anger or loneliness in the last month? No   Who do you live with? Community   If you need help, do you have trouble finding someone available to you? No   Have you been bothered in the last four weeks by sexual problems? No   Do you have difficulty concentrating, remembering or making decisions? No       Age-appropriate Screening Schedule:  Refer to the list below for future screening recommendations based on patient's age, sex and/or medical conditions. Orders for these  "recommended tests are listed in the plan section. The patient has been provided with a written plan.    Health Maintenance   Topic Date Due    TDAP/TD VACCINES (1 - Tdap) Never done    INFLUENZA VACCINE  08/01/2023    BMI FOLLOWUP  10/31/2023    LIPID PANEL  11/27/2024    ANNUAL WELLNESS VISIT  11/30/2024    COLORECTAL CANCER SCREENING  02/23/2025    HEPATITIS C SCREENING  Completed    COVID-19 Vaccine  Completed    Pneumococcal Vaccine 65+  Completed    ZOSTER VACCINE  Completed                  CMS Preventative Services Quick Reference  Risk Factors Identified During Encounter  Immunizations Discussed/Encouraged: Tdap and Influenza  The above risks/problems have been discussed with the patient.  Pertinent information has been shared with the patient in the After Visit Summary.  An After Visit Summary and PPPS were made available to the patient.    Follow Up:   Next Medicare Wellness visit to be scheduled in 1 year.       Additional E&M Note during same encounter follows:  Patient has multiple medical problems which are significant and separately identifiable that require additional work above and beyond the Medicare Wellness Visit.      Chief Complaint  Medicare Wellness-subsequent    Subjective        HPI  Best MARISA EsquivelAdam is also being seen today for Medicare wellness and hypothryoidism  He is seeing Dr. Salazar and he was started on cialis. He denies chest pain. Overall his gut is better than 2 years ago. He is stable emotionally. He sees vascular once per year. He can be tired by the early afternoon. He will take a nap. He does snore.       Objective   Vital Signs:  /90   Ht 180.3 cm (70.98\")   Wt 96.4 kg (212 lb 9.6 oz)   BMI 29.67 kg/m²     Physical Exam  Vitals reviewed.   Constitutional:       Appearance: He is well-developed.   HENT:      Head: Normocephalic and atraumatic.   Neck:      Thyroid: No thyromegaly.      Vascular: No carotid bruit.   Cardiovascular:      Rate and Rhythm: Normal rate and " regular rhythm.      Heart sounds: Normal heart sounds, S1 normal and S2 normal.   Pulmonary:      Effort: Pulmonary effort is normal.      Breath sounds: Normal breath sounds.   Abdominal:      Palpations: Abdomen is soft. There is no hepatomegaly or splenomegaly.      Tenderness: There is no abdominal tenderness.   Lymphadenopathy:      Cervical: No cervical adenopathy.   Skin:     General: Skin is warm.   Neurological:      Mental Status: He is alert.   Psychiatric:         Behavior: Behavior normal.                         Assessment and Plan   Diagnoses and all orders for this visit:    1. Encounter for subsequent annual wellness visit (AWV) in Medicare patient (Primary)    2. Anxiety  Comments:  see below    3. Other specified hypothyroidism  Comments:  TSH at goal    4. Essential hypertension  -     CBC & Differential  -     amLODIPine (NORVASC) 5 MG tablet; Take 1 tablet by mouth Daily.  Dispense: 30 tablet; Refill: 2    5. Other hyperlipidemia  Comments:  LDL is good    6. Snoring  -     Ambulatory Referral to Sleep Medicine             Follow Up   Return in about 6 weeks (around 1/11/2024), or 30 minutes, for Lab Today.  Patient was given instructions and counseling regarding his condition or for health maintenance advice. Please see specific information pulled into the AVS if appropriate.         Reviewed labs. Check CBC to be complete for his fatigue. I will refer him for sleep eval. Discussed if emotions are playing a role in his fatigue. He would like to pursue sleep study before considering increasing SSRI. Add Norvasc for HTN. He will also check in with Dr. Mills.

## 2024-01-03 DIAGNOSIS — I10 ESSENTIAL HYPERTENSION: Chronic | ICD-10-CM

## 2024-01-03 RX ORDER — OMEPRAZOLE 20 MG/1
40 CAPSULE, DELAYED RELEASE ORAL DAILY
Qty: 180 CAPSULE | Refills: 3 | Status: SHIPPED | OUTPATIENT
Start: 2024-01-03

## 2024-01-03 RX ORDER — AMLODIPINE BESYLATE 5 MG/1
5 TABLET ORAL DAILY
Qty: 90 TABLET | Refills: 2 | Status: SHIPPED | OUTPATIENT
Start: 2024-01-03

## 2024-01-03 NOTE — TELEPHONE ENCOUNTER
Caller: Best Adam    Relationship: Self    Best call back number: [phone]     Requested Prescriptions:   Requested Prescriptions     Pending Prescriptions Disp Refills    omeprazole (priLOSEC) 20 MG capsule 180 capsule 3     Sig: Take 2 capsules by mouth Daily.    amLODIPine (NORVASC) 5 MG tablet 30 tablet 2     Sig: Take 1 tablet by mouth Daily.        Pharmacy where request should be sent: [address] [phone] [facility] [phone] [facility]     Last office visit with prescribing clinician: 11/30/2023   Last telemedicine visit with prescribing clinician: Visit date not found   Next office visit with prescribing clinician: 2/2/2024       Does the patient have less than a 3 day supply:  [x] Yes  [] No    Would you like a call back once the refill request has been completed: [] Yes [x] No    If the office needs to give you a call back, can they leave a voicemail: [] Yes [x] No    Bubba Spence Rep   01/03/24 14:23 EST

## 2024-02-01 ENCOUNTER — OFFICE VISIT (OUTPATIENT)
Dept: SLEEP MEDICINE | Facility: HOSPITAL | Age: 74
End: 2024-02-01
Payer: MEDICARE

## 2024-02-01 VITALS
SYSTOLIC BLOOD PRESSURE: 135 MMHG | WEIGHT: 214.8 LBS | HEART RATE: 59 BPM | BODY MASS INDEX: 30.07 KG/M2 | DIASTOLIC BLOOD PRESSURE: 76 MMHG | HEIGHT: 71 IN | OXYGEN SATURATION: 98 %

## 2024-02-01 DIAGNOSIS — G47.19 EXCESSIVE DAYTIME SLEEPINESS: ICD-10-CM

## 2024-02-01 DIAGNOSIS — G47.30 OBSERVED SLEEP APNEA: Primary | ICD-10-CM

## 2024-02-01 DIAGNOSIS — R06.83 SNORING: ICD-10-CM

## 2024-02-01 DIAGNOSIS — I10 ESSENTIAL HYPERTENSION: Chronic | ICD-10-CM

## 2024-02-01 DIAGNOSIS — G47.8 NON-RESTORATIVE SLEEP: ICD-10-CM

## 2024-02-01 PROCEDURE — G0463 HOSPITAL OUTPT CLINIC VISIT: HCPCS

## 2024-02-01 NOTE — PROGRESS NOTES
[facility]  [address]  [address]  [place] [address]  Phone   Fax       Best Adam  [phone]   1950  73 y.o.  male      PCP:Tim Kim MD    Type of service: Initial New Patient Office Visit  Date of service: 2/1/2024    Chief Complaint   Patient presents with    Witnessed Apnea    Snoring    Non-restorative Sleep    Fatigue    Dry Mouth    Obesity    Daytime Sleepiness       History of present illness;  Best Adam 73 y.o.  is a new patient for me and was seen today for sleep related problems of snoring, non-restorative sleep and witnessed apneas. The symptoms are present for 1 to 2 years and they are persistent in nature.  The snoring is present in all positions and it is loud.  Patient has no prior surgery namely tonsillectomy, nasal surgery and UPPP.     He is a retired used to work in [employer].  He states that even though he sleeps for 8 to 9 hours he feels sleepy in the afternoon and has to take 1 hour nap.  He is tired.  He also has developed hypertension for which she is taking medications.    Patient gives the following sleep history.  Sleep schedule:  Bedtime: 9 PM  Wake time: 7:30 AM  Normally takes about less than 10-15 minutes to fall asleep  Average hours of sleep 8-10  Number of naps per day 1  Symptoms  In addition to snoring, nonrestorative sleep and witnessed apneas patient gives the following associated symptoms.  Have you ever awakened gasping for breath, coughing, choking: Yes   Change in weight,  No   Morning headaches  No   Awaken with a sore throat or dry mouth  Yes   Leg jerking at night:  No   Crawly feeling/urge sensation to move in the legs: No   Teeth grinding:Yes   Have you ever awakened at night with a sour taste or burning sensation in your chest:  Yes   Do you have muscle weakness with laughing or anger or sleep paralysis:  No   Have you ever felt paralyzed while going to sleep or waking up:  No  "  Vivid dreams, yes   Nocturia (urination at night): 2 times per night  Memory Problem:No     Past medical history: (Relevant to sleep medicine)  Hypertension  GERD  Anxiety    Medications are reviewed by me and documented in the encounter  Allergies reviewed and documented in encounter    Social history:  Do you drive a commercial vehicle:  No   Shift work:  No   Tobacco use:  No   Alcohol use:  15 per week  Caffeinated drinks: 3    FAMILY HISTORY (Your mother, father, brothers and sisters) (relevant to sleep medicine)  No history of sleep apnea    REVIEW OF SYSTEMS.  Full review of systems available on the intake form which is scanned in the media tab.  The relevant positive are noted below  Daytime excessive sleepiness with [place] Sleepiness Scale :Total score: 5   Snoring  Abdominal bloating  Anxiety  Nonrestorative sleep      Physical exam:  Vitals:    02/01/24 0900   BP: 135/76   Pulse: 59   SpO2: 98%   Weight: 97.4 kg (214 lb 12.8 oz)   Height: 180.3 cm (71\")    Body mass index is 29.96 kg/m². Neck Circumference: 16.5 inches  Nose: no nasal septal defects or deviation and the nasal passages are clear, no nasal polyps,  Throat: tonsils are nonenlarged, tongue normal, oral airway Mallampati class 3  NECK:Neck Circumference: 16.5 inches, trachea is in the midline, thyroid not enlarged  RESPIRATORY SYSTEM: Breath sounds are equal on both sides, there are no wheezes   CARDIOVASULAR SYSTEM: Heart sounds are regular rhythm and vinicio rate, no edema  EXTREMITES: No cyanosis, clubbing  NEUROLOGICAL SYSTEM: Oriented x 3, no gross motor defects, gait normal    Office notes from care team reviewed. Office note dated November 30, 2023,reviewed  Labs reviewed.  TSH Results:  TSH          4/24/2023    13:42 11/27/2023    10:42   TSH   TSH 0.694  1.010            Assessment and plan:  Witnessed apnea (R06.81) patient's symptoms and examination is consistent with sleep apnea (G47.30)  I have talked to the patient about the " signs and symptoms of sleep apnea. In addition, I have also discussed pathophysiology of sleep apnea.  I also discussed the complications of untreated sleep apnea including effects on hypertension, diabetes mellitus and nonrestorative sleep with hypersomnia which can increase risk for motor vehicle accidents.  Untreated sleep apnea is also a risk factor for development of atrial fibrillation, pulmonary hypertension, insulin resistance and stroke.  Discussed in detail of various testing methods including home-based and lab based sleep studies.  Based on history and physical examination the most appropriate study is home sleep test.  [facility]  The test will be scheduled after approval from insurance. Treatment and management will be discussed after the test is completed.  Patient was given opportunity to ask questions and all the questions were answered.   Snoring (R06.83) snoring is the sound created by turbulent airflow vibrating upper airway soft tissue.  I have also discussed factors affecting snoring including sleep deprivation, sleeping on the back and alcohol ingestion. To minimize snoring, patient is advised to have adequate sleep, sleep on the side and avoid alcohol and sedative medications before bedtime  Daytime excessive sleepiness .  It was assessed with [place] Sleepiness Scale of Total score: 5.  There are many causes for daytime excessive sleepiness including sleep depression, shiftwork syndrome, depression and other medical disorders including heart, kidney and liver failure.  The most serious cause of excessive sleepiness is due to neurological conditions like narcolepsy/cataplexy.  But the most common cause of excessive sleepiness is due to sleep apnea with frequent awakenings during sleep time.  I have discussed safety of driving and to remain vigilant while driving.  Hypertension,  Essential hypertension is highly correlated with sleep apnea. Treating sleep  apnea will assist in good blood pressure control.    Return for 31 to 90 days after PAP setup with down load..  Patient's questions were answered.      2/1/2024  Zelda Langley MD  Sleep Medicine  Medical Director  [facility]: [facility] sleep [facility]

## 2024-02-02 ENCOUNTER — OFFICE VISIT (OUTPATIENT)
Dept: INTERNAL MEDICINE | Facility: CLINIC | Age: 74
End: 2024-02-02
Payer: MEDICARE

## 2024-02-02 VITALS
SYSTOLIC BLOOD PRESSURE: 118 MMHG | BODY MASS INDEX: 29.9 KG/M2 | HEIGHT: 71 IN | WEIGHT: 213.6 LBS | DIASTOLIC BLOOD PRESSURE: 80 MMHG

## 2024-02-02 DIAGNOSIS — E78.49 OTHER HYPERLIPIDEMIA: Chronic | ICD-10-CM

## 2024-02-02 DIAGNOSIS — I10 ESSENTIAL HYPERTENSION: Primary | Chronic | ICD-10-CM

## 2024-02-02 DIAGNOSIS — E03.8 OTHER SPECIFIED HYPOTHYROIDISM: ICD-10-CM

## 2024-02-02 PROCEDURE — 3079F DIAST BP 80-89 MM HG: CPT | Performed by: INTERNAL MEDICINE

## 2024-02-02 PROCEDURE — 3074F SYST BP LT 130 MM HG: CPT | Performed by: INTERNAL MEDICINE

## 2024-02-02 PROCEDURE — 1159F MED LIST DOCD IN RCRD: CPT | Performed by: INTERNAL MEDICINE

## 2024-02-02 PROCEDURE — 1160F RVW MEDS BY RX/DR IN RCRD: CPT | Performed by: INTERNAL MEDICINE

## 2024-02-02 PROCEDURE — 99213 OFFICE O/P EST LOW 20 MIN: CPT | Performed by: INTERNAL MEDICINE

## 2024-02-02 RX ORDER — ATORVASTATIN CALCIUM 80 MG/1
80 TABLET, FILM COATED ORAL DAILY
Qty: 90 TABLET | Refills: 3 | Status: SHIPPED | OUTPATIENT
Start: 2024-02-02

## 2024-02-02 NOTE — PROGRESS NOTES
Subjective        Chief Complaint   Patient presents with    Hypertension           Best Adam is a 73 y.o. male who presents for    Patient Active Problem List   Diagnosis    Other hyperlipidemia    Essential hypertension    Other specified hypothyroidism    Anxiety    Tubular adenoma    Bilateral popliteal artery aneurysm    Raynaud's phenomenon without gangrene    Mosley's esophagus without dysplasia    Observed sleep apnea    Snoring    Non-restorative sleep    Excessive daytime sleepiness       History of Present Illness     He has not been checking his BP at home. He met with the sleep doctor yesterday and he is to have a sleep study. He denies chest pain or dyspnea.    No Known Allergies    Current Outpatient Medications on File Prior to Visit   Medication Sig Dispense Refill    amLODIPine (NORVASC) 5 MG tablet Take 1 tablet by mouth Daily. 90 tablet 2    busPIRone (BUSPAR) 10 MG tablet Take 1 tablet by mouth 3 (Three) Times a Day As Needed (anxiety). 90 tablet 2    cholecalciferol (VITAMIN D3) 1000 units tablet Take 1 tablet by mouth Daily.      escitalopram (LEXAPRO) 5 MG tablet Take 1 tablet by mouth Daily. 90 tablet 3    levothyroxine (SYNTHROID, LEVOTHROID) 100 MCG tablet Take 1 tablet by mouth Daily. 90 tablet 3    losartan (COZAAR) 100 MG tablet Take 0.5 tablets by mouth Daily. 90 tablet 3    Multiple Vitamins-Minerals (OCUVITE ADULT 50+ PO) Take 1 tablet by mouth Daily.      omeprazole (priLOSEC) 20 MG capsule Take 2 capsules by mouth Daily. 180 capsule 3    tadalafil (CIALIS) 5 MG tablet Take 1 tablet by mouth Daily.      Unable to find Take 2 each by mouth Daily. FD Guard      [DISCONTINUED] atorvastatin (LIPITOR) 80 MG tablet Take 1 tablet by mouth Daily. 90 tablet 3     No current facility-administered medications on file prior to visit.       Past Medical History:   Diagnosis Date    COVID-19 01/2022    Diverticulitis of colon 2019    History of prostate cancer 2011    History of syncope  2006    RBBB 05/07/2019    Renal cyst, right 12/2021    Tubular adenoma 02/23/2022       Past Surgical History:   Procedure Laterality Date    COLONOSCOPY  2012    COLONOSCOPY N/A 07/30/2019    tics, NBIH, TA x 2, HP x 1    COLONOSCOPY  02/23/2022    ENDOSCOPY N/A 07/30/2019    Z line irregular, 39-41 cm from incisors, Chronic H pylori gastritis, Mosley's esophagus    ENDOSCOPY  02/23/2022    INGUINAL HERNIA REPAIR Right 2013    PROSTATECTOMY  2011    UPPER GASTROINTESTINAL ENDOSCOPY  07/30/2019       Family History   Problem Relation Age of Onset    Neuropathy Mother     Alzheimer's disease Father     Lung cancer Father     Colon polyps Father     Macular degeneration Sister     Arthritis Sister     Hearing loss Sister     Breast cancer Sister     Malig Hyperthermia Neg Hx        Social History     Socioeconomic History    Marital status: Single   Tobacco Use    Smoking status: Never    Smokeless tobacco: Never   Vaping Use    Vaping Use: Never used   Substance and Sexual Activity    Alcohol use: Yes     Comment: 2 drinks per day    Drug use: No    Sexual activity: Not Currently     Partners: Female     Birth control/protection: None           The following portions of the patient's history were reviewed and updated as appropriate: problem list, allergies, current medications, past medical history, past family history, past social history, and past surgical history.    Review of Systems    Immunization History   Administered Date(s) Administered    COVID-19 (PFIZER) BIVALENT 12+YRS 12/14/2022, 05/10/2023    COVID-19 (PFIZER) Purple Cap Monovalent 02/05/2021, 03/15/2021, 09/27/2021    COVID-19 F23 (PFIZER) 12YRS+ (COMIRNATY) 10/18/2023    Covid-19 (Pfizer) Gray Cap Monovalent 07/14/2022    Fluzone High Dose =>65 Years ([employer]axcare ONLY) 10/01/2018, 12/15/2019, 10/11/2021    Fluzone High-Dose 65+yrs 10/19/2022, 11/30/2023    Pneumococcal Conjugate 13-Valent (PCV13) 06/25/2019    Pneumococcal Polysaccharide (PPSV23)  "06/30/2020    Shingrix 11/16/2022, 02/15/2023       Objective   Vitals:    02/02/24 1417   BP: 118/80   Weight: 96.9 kg (213 lb 9.6 oz)   Height: 180.3 cm (70.98\")     Body mass index is 29.8 kg/m².  Physical Exam  Vitals reviewed.   Constitutional:       Appearance: He is well-developed.   HENT:      Head: Normocephalic and atraumatic.   Cardiovascular:      Rate and Rhythm: Normal rate and regular rhythm.      Heart sounds: Normal heart sounds, S1 normal and S2 normal.   Pulmonary:      Effort: Pulmonary effort is normal.      Breath sounds: Normal breath sounds.   Skin:     General: Skin is warm.   Neurological:      Mental Status: He is alert.   Psychiatric:         Behavior: Behavior normal.         Procedures    Assessment & Plan   Diagnoses and all orders for this visit:    1. Essential hypertension (Primary)    2. Other hyperlipidemia  -     atorvastatin (LIPITOR) 80 MG tablet; Take 1 tablet by mouth Daily.  Dispense: 90 tablet; Refill: 3    3. Other specified hypothyroidism  -     TSH; Future        BP is good. He has a sleep study set up.            Return in about 4 months (around 6/2/2024), or 30 minutes, for Lab Before FUP.  "

## 2024-02-20 ENCOUNTER — HOSPITAL ENCOUNTER (OUTPATIENT)
Dept: SLEEP MEDICINE | Facility: HOSPITAL | Age: 74
Discharge: HOME | End: 2024-02-20
Admitting: INTERNAL MEDICINE
Payer: MEDICARE

## 2024-02-20 DIAGNOSIS — G47.19 EXCESSIVE DAYTIME SLEEPINESS: ICD-10-CM

## 2024-02-20 DIAGNOSIS — G47.30 OBSERVED SLEEP APNEA: ICD-10-CM

## 2024-02-20 DIAGNOSIS — R06.83 SNORING: ICD-10-CM

## 2024-02-20 DIAGNOSIS — G47.8 NON-RESTORATIVE SLEEP: ICD-10-CM

## 2024-02-20 PROCEDURE — 95806 SLEEP STUDY UNATT&RESP EFFT: CPT

## 2024-02-29 DIAGNOSIS — R06.83 SNORING: ICD-10-CM

## 2024-02-29 DIAGNOSIS — G47.33 OSA (OBSTRUCTIVE SLEEP APNEA): Primary | ICD-10-CM

## 2024-03-05 ENCOUNTER — TELEPHONE (OUTPATIENT)
Dept: SLEEP MEDICINE | Facility: HOSPITAL | Age: 74
End: 2024-03-05
Payer: MEDICARE

## 2024-03-07 DIAGNOSIS — I72.4 ANEURYSM OF ARTERY OF LOWER EXTREMITY: Primary | ICD-10-CM

## 2024-03-14 ENCOUNTER — OFFICE VISIT (OUTPATIENT)
Dept: SLEEP MEDICINE | Facility: HOSPITAL | Age: 74
End: 2024-03-14
Payer: MEDICARE

## 2024-03-14 VITALS
WEIGHT: 215 LBS | OXYGEN SATURATION: 98 % | SYSTOLIC BLOOD PRESSURE: 135 MMHG | DIASTOLIC BLOOD PRESSURE: 66 MMHG | HEIGHT: 71 IN | BODY MASS INDEX: 30.1 KG/M2 | HEART RATE: 66 BPM

## 2024-03-14 DIAGNOSIS — R06.83 SNORING: ICD-10-CM

## 2024-03-14 DIAGNOSIS — G47.30 OBSERVED SLEEP APNEA: Primary | ICD-10-CM

## 2024-03-14 DIAGNOSIS — I10 ESSENTIAL HYPERTENSION: Chronic | ICD-10-CM

## 2024-03-14 DIAGNOSIS — G47.8 NON-RESTORATIVE SLEEP: ICD-10-CM

## 2024-03-14 DIAGNOSIS — G47.19 EXCESSIVE DAYTIME SLEEPINESS: ICD-10-CM

## 2024-03-14 PROCEDURE — G0463 HOSPITAL OUTPT CLINIC VISIT: HCPCS

## 2024-03-14 NOTE — PROGRESS NOTES
"  [facility]  [address]  [address]  [place] [address]  Phone   Fax       SLEEP CLINIC FOLLOW UP PROGRESS NOTE.    Best Adam  [id number]   1950  73 y.o.  male      PCP: Tim Kim MD      Date of visit: 3/14/2024    Chief Complaint   Patient presents with    Witnessed Apnea    Snoring    Non-restorative Sleep    Fatigue    Dry Mouth    Daytime Sleepiness    Obesity       HPI:  This is a 73 y.o. years old patient is here for the evaluation of symptoms of sleep apnea.  Patient underwent home sleep test last month but he feels that the test was not accurate because the bills were not tight enough and he has to get the device anchored with a different beds.  His symptoms are suggestive of sleep apnea.  As the patient did not had any good home sleep test I feel that we should proceed with a in lab test to confirm presence of sleep apnea.  His symptoms are very suggestive of sleep apnea  Medications and allergies are reviewed by me and documented in the encounter.     SOCIAL (habits pertaining to sleep medicine)  History tobacco use:No   History of alcohol use: 12 per week  Caffeine use: 3     REVIEW OF SYSTEMS:   Pertaining positive symptoms are:  [place] Sleepiness Scale :Total score: 6   Anxiety      PHYSICAL EXAMINATION:  CONSTITUTIONAL:  Vitals:    03/14/24 1300   BP: 135/66   Pulse: 66   SpO2: 98%   Weight: 97.5 kg (215 lb)   Height: 180.3 cm (70.98\")    Body mass index is 30 kg/m².   NOSE: nasal passages are clear, No deformities noted   RESP SYSTEM: Not in any respiratory distress, no chest deformities noted,   CARDIOVASULAR: No edema noted  NEURO: Oriented x 3, gait normal,  Mood and affect appeared appropriate            ASSESSMENT AND PLAN:  Witnessed apnea (R06.81) patient's symptoms and examination is consistent with sleep apnea (G47.30)  I have talked to the patient about the signs and symptoms of sleep apnea. In addition, I have also " discussed pathophysiology of sleep apnea.  I also discussed the complications of untreated sleep apnea including effects on hypertension, diabetes mellitus and nonrestorative sleep with hypersomnia which can increase risk for motor vehicle accidents.  Untreated sleep apnea is also a risk factor for development of atrial fibrillation, pulmonary hypertension, insulin resistance and stroke.  Discussed in detail of various testing methods including home-based and lab based sleep studies.  Based on history and physical examination the most appropriate study is full night polysomnography.  The order for the sleep study is placed in [facility].  The test will be scheduled after approval from insurance. Treatment and management will be discussed after the test is completed.  Patient was given opportunity to ask questions and all the questions were answered.   Snoring (R06.83) snoring is the sound created by turbulent airflow vibrating upper airway soft tissue.  I have also discussed factors affecting snoring including sleep deprivation, sleeping on the back and alcohol ingestion. To minimize snoring, patient is advised to have adequate sleep, sleep on the side and avoid alcohol and sedative medications before bedtime  Daytime excessive sleepiness .  It was assessed with [place] Sleepiness Scale of Total score: 5.  There are many causes for daytime excessive sleepiness including sleep depression, shiftwork syndrome, depression and other medical disorders including heart, kidney and liver failure.  The most serious cause of excessive sleepiness is due to neurological conditions like narcolepsy/cataplexy.  But the most common cause of excessive sleepiness is due to sleep apnea with frequent awakenings during sleep time.  I have discussed safety of driving and to remain vigilant while driving.  Hypertension,  Essential hypertension is highly correlated with sleep apnea. Treating sleep apnea will assist in good blood pressure  control.  Return for 31 to 90 days after PAP setup with down load. . Patient's questions were answered.    3/14/2024  Zelda Langley MD  Sleep Medicine.  Medical Director,   [facility], [facility] sleep [facility].               no

## 2024-03-20 ENCOUNTER — HOSPITAL ENCOUNTER (OUTPATIENT)
Dept: SLEEP MEDICINE | Facility: HOSPITAL | Age: 74
Discharge: HOME | End: 2024-03-20
Admitting: INTERNAL MEDICINE
Payer: MEDICARE

## 2024-03-20 VITALS — BODY MASS INDEX: 30.1 KG/M2 | HEIGHT: 71 IN | WEIGHT: 215 LBS

## 2024-03-20 DIAGNOSIS — I10 ESSENTIAL HYPERTENSION: Chronic | ICD-10-CM

## 2024-03-20 DIAGNOSIS — G47.8 NON-RESTORATIVE SLEEP: ICD-10-CM

## 2024-03-20 DIAGNOSIS — G47.19 EXCESSIVE DAYTIME SLEEPINESS: ICD-10-CM

## 2024-03-20 DIAGNOSIS — R06.83 SNORING: ICD-10-CM

## 2024-03-20 DIAGNOSIS — G47.30 OBSERVED SLEEP APNEA: ICD-10-CM

## 2024-03-20 PROCEDURE — 95810 POLYSOM 6/> YRS 4/> PARAM: CPT

## 2024-03-21 PROCEDURE — 95810 POLYSOM 6/> YRS 4/> PARAM: CPT | Performed by: INTERNAL MEDICINE

## 2024-03-29 ENCOUNTER — TELEPHONE (OUTPATIENT)
Dept: SLEEP MEDICINE | Facility: HOSPITAL | Age: 74
End: 2024-03-29
Payer: MEDICARE

## 2024-04-11 ENCOUNTER — OFFICE VISIT (OUTPATIENT)
Dept: SLEEP MEDICINE | Facility: HOSPITAL | Age: 74
End: 2024-04-11
Payer: MEDICARE

## 2024-04-11 VITALS
WEIGHT: 215 LBS | HEIGHT: 71 IN | SYSTOLIC BLOOD PRESSURE: 113 MMHG | HEART RATE: 60 BPM | DIASTOLIC BLOOD PRESSURE: 75 MMHG | OXYGEN SATURATION: 97 % | BODY MASS INDEX: 30.1 KG/M2

## 2024-04-11 DIAGNOSIS — G47.61 PLMD (PERIODIC LIMB MOVEMENT DISORDER): Primary | ICD-10-CM

## 2024-04-11 PROBLEM — G47.30 OBSERVED SLEEP APNEA: Status: RESOLVED | Noted: 2024-02-01 | Resolved: 2024-04-11

## 2024-04-11 PROBLEM — G47.8 NON-RESTORATIVE SLEEP: Status: RESOLVED | Noted: 2024-02-01 | Resolved: 2024-04-11

## 2024-04-11 PROBLEM — G47.19 EXCESSIVE DAYTIME SLEEPINESS: Status: RESOLVED | Noted: 2024-02-01 | Resolved: 2024-04-11

## 2024-04-11 PROBLEM — R06.83 SNORING: Status: RESOLVED | Noted: 2024-02-01 | Resolved: 2024-04-11

## 2024-04-11 PROCEDURE — 1159F MED LIST DOCD IN RCRD: CPT | Performed by: INTERNAL MEDICINE

## 2024-04-11 PROCEDURE — 3074F SYST BP LT 130 MM HG: CPT | Performed by: INTERNAL MEDICINE

## 2024-04-11 PROCEDURE — 3078F DIAST BP <80 MM HG: CPT | Performed by: INTERNAL MEDICINE

## 2024-04-11 PROCEDURE — 99214 OFFICE O/P EST MOD 30 MIN: CPT | Performed by: INTERNAL MEDICINE

## 2024-04-11 PROCEDURE — G0463 HOSPITAL OUTPT CLINIC VISIT: HCPCS

## 2024-04-11 PROCEDURE — 1160F RVW MEDS BY RX/DR IN RCRD: CPT | Performed by: INTERNAL MEDICINE

## 2024-04-11 RX ORDER — CLONAZEPAM 0.5 MG/1
0.5 TABLET ORAL NIGHTLY
Qty: 30 TABLET | Refills: 5 | Status: SHIPPED | OUTPATIENT
Start: 2024-04-11

## 2024-04-11 NOTE — PROGRESS NOTES
"  [facility]  [address]  [address]  [place] [address]  Phone   Fax       SLEEP CLINIC FOLLOW UP PROGRESS NOTE.    Best Adam  [id number]   1950  73 y.o.  male      PCP: Tim Kim MD      Date of visit: 4/11/2024    Chief Complaint   Patient presents with    Leg/body Jerks During Sleep       HPI:  This is a 73 y.o. years old patient is here for the management of PLMD.  Patient had a.m. lab follow-up polysomnography which did not show any evidence of sleep apnea his apnea hypopnea index was only 3.1/h.  But patient has significant PLMS with an index of 124/h with a total events of 529 and a PLMS arousals of 85 events with a index of 20/h.  Patient is here to discuss the test results and treatment options.  Patient also is on small dose of Lexapro which is going to be weaned off.  SSRIs can augment the PLMS.    Medications and allergies are reviewed by me and documented in the encounter.     SOCIAL (habits pertaining to sleep medicine)  History tobacco use:No   History of alcohol use: 14 per week  Caffeine use: 4     REVIEW OF SYSTEMS:   Pertaining positive symptoms are:  [place] Sleepiness Scale :Total score: 5       PHYSICAL EXAMINATION:  CONSTITUTIONAL:  Vitals:    04/11/24 1036   BP: 113/75   Pulse: 60   SpO2: 97%   Weight: 97.5 kg (215 lb)   Height: 180.3 cm (70.98\")    Body mass index is 30 kg/m².   NOSE: nasal passages are clear, No deformities noted   RESP SYSTEM: Not in any respiratory distress, no chest deformities noted,   CARDIOVASULAR: No edema noted  NEURO: Oriented x 3, gait normal,  Mood and affect appeared appropriate        ASSESSMENT AND PLAN:  PLMD.  I am going to start him on 0.5 mg of clonazepam to be taken about 30 minutes before bedtime.  He is also going to wean off Lexapro he takes 5 mg.  I have told him to reduce the dose to 2.5 mg for about 2 weeks and then stop the medication altogether.  Prescription has been " sent  Return in about 6 months (around 10/11/2024). . Patient's questions were answered.    4/11/2024  Zelda Langley MD  Sleep Medicine.  Medical Director,   [facility], [facility] sleep [facility].

## 2024-04-17 NOTE — PROGRESS NOTES
"Chief Complaint  Popliteal Aneurysm (Bilateral )    Subjective        HPI: Best Adam is a 73 y.o. gentleman who returns for follow-up of bilateral popliteal artery aneurysms.  He has not had any problems with ambulation or with digital discoloration.  Being evaluated and treated for periodic limb movements of sleep,, beginning on low-dose clonazepam.  Takes high-dose atorvastatin.    Objective   Vital Signs:  /70   Ht 180.3 cm (70.98\")   Wt 97.5 kg (215 lb)   BMI 30.00 kg/m²     Result Review :      Data: Images and tracings of limited arterial duplex scan of the right and left lower extremities personally reviewed.  Has normal flow through the femoral-popliteal segment, with normal waveforms and velocities.  Popliteal arteries measure 1.4 and 1.5 cm, respectively.      Best Adam  reports that he has never smoked. He has never used smokeless tobacco..           Assessment and Plan     Diagnoses and all orders for this visit:    1. Bilateral popliteal artery aneurysm (Primary)  -     Duplex Lower Extremity Art / Grafts - Left CAR; Future  -     Duplex Lower Extremity Art / Grafts - Right CAR; Future    73-year-old gentleman with small bilateral popliteal artery aneurysms, returns for follow-up.  When last seen February, 2023, his aneurysms measured 1.3 cm, and they were normal multiphasic arterial Doppler signals present bilaterally.  Today there are millimeters differences, with the popliteal arteries measuring maximum 1.4 and 1.5 cm on the right and left.  No need for intervention at this time.  Reassess 1 year.  Continue atorvastatin.    Follow Up     Return in about 1 year (around 4/19/2025).  Patient was given instructions and counseling regarding his condition or for health maintenance advice. Please see specific information pulled into the AVS if appropriate.         "

## 2024-04-19 ENCOUNTER — OFFICE VISIT (OUTPATIENT)
Age: 74
End: 2024-04-19
Payer: MEDICARE

## 2024-04-19 ENCOUNTER — HOSPITAL ENCOUNTER (OUTPATIENT)
Facility: HOSPITAL | Age: 74
Discharge: HOME | End: 2024-04-19
Payer: MEDICARE

## 2024-04-19 VITALS
SYSTOLIC BLOOD PRESSURE: 118 MMHG | DIASTOLIC BLOOD PRESSURE: 70 MMHG | BODY MASS INDEX: 30.1 KG/M2 | HEIGHT: 71 IN | WEIGHT: 215 LBS

## 2024-04-19 DIAGNOSIS — I72.4 ANEURYSM OF ARTERY OF LOWER EXTREMITY: ICD-10-CM

## 2024-04-19 DIAGNOSIS — I72.4 BILATERAL POPLITEAL ARTERY ANEURYSM: Primary | ICD-10-CM

## 2024-04-19 LAB
BH CV LEA LEFT POPITEAL A  DISTAL PSV: -55.1 CM/S
BH CV LEA LEFT POPITEAL A  MID PSV: 39.3 CM/S
BH CV LEA LEFT POPITEAL A  PROX PSV: 37.5 CM/S
BH CV LEA LEFT SFA DISTAL PSV: -44 CM/S
BH CV LEA RIGHT POPITEAL A  DISTAL PSV: -65.7 CM/S
BH CV LEA RIGHT POPITEAL A  MID PSV: 44.6 CM/S
BH CV LEA RIGHT POPITEAL A  PROX PSV: 45.7 CM/S
BH CV LEA RIGHT SFA DISTAL PSV: -55.8 CM/S
BH CV VAS LEA LEFT POPLITEAL DISTAL ANEURYSM LONG: 0.8 CM
BH CV VAS LEA LEFT POPLITEAL DISTAL ANEURYSM TRANS: 0.9 CM
BH CV VAS LEA LEFT POPLITEAL MID ANEURYSM LONG: 1.3 CM
BH CV VAS LEA LEFT POPLITEAL MID ANEURYSM TRANS: 1.3 CM
BH CV VAS LEA LEFT POPLITEAL PROX ANEURYSM LONG: 1.5 CM
BH CV VAS LEA LEFT POPLITEAL PROX ANEURYSM TRANS: 1.5 CM
BH CV VAS LEA LEFT SFA DISTAL ANEURYSM LONG: 1.3 CM
BH CV VAS LEA LEFT SFA DISTAL ANEURYSM TRANS: 1.3 CM
BH CV VAS LEA RIGHT POPLITEAL DISTAL ANEURYSM LONG: 0.9 CM
BH CV VAS LEA RIGHT POPLITEAL DISTAL ANEURYSM TRANS: 0.9 CM
BH CV VAS LEA RIGHT POPLITEAL MID ANEURYSM LONG: 1.3 CM
BH CV VAS LEA RIGHT POPLITEAL MID ANEURYSM TRANS: 1.3 CM
BH CV VAS LEA RIGHT POPLITEAL PROX ANEURYSM LONG: 1.5 CM
BH CV VAS LEA RIGHT POPLITEAL PROX ANEURYSM TRANS: 1.5 CM
BH CV VAS LEA RIGHT SFA DISTAL ANEURYSM LONG: 1.4 CM
BH CV VAS LEA RIGHT SFA DISTAL ANEURYSM TRANS: 1.3 CM

## 2024-04-19 PROCEDURE — 93925 LOWER EXTREMITY STUDY: CPT

## 2024-06-03 ENCOUNTER — OFFICE VISIT (OUTPATIENT)
Dept: INTERNAL MEDICINE | Facility: CLINIC | Age: 74
End: 2024-06-03
Payer: MEDICARE

## 2024-06-03 VITALS
BODY MASS INDEX: 30.63 KG/M2 | SYSTOLIC BLOOD PRESSURE: 122 MMHG | HEIGHT: 71 IN | DIASTOLIC BLOOD PRESSURE: 82 MMHG | WEIGHT: 218.8 LBS

## 2024-06-03 DIAGNOSIS — Z85.46 H/O PROSTATE CANCER: ICD-10-CM

## 2024-06-03 DIAGNOSIS — I10 ESSENTIAL HYPERTENSION: Primary | Chronic | ICD-10-CM

## 2024-06-03 DIAGNOSIS — E78.00 HIGH CHOLESTEROL: ICD-10-CM

## 2024-06-03 DIAGNOSIS — F41.9 ANXIETY: ICD-10-CM

## 2024-06-03 DIAGNOSIS — E03.8 OTHER SPECIFIED HYPOTHYROIDISM: Chronic | ICD-10-CM

## 2024-06-03 DIAGNOSIS — G47.61 PLMD (PERIODIC LIMB MOVEMENT DISORDER): ICD-10-CM

## 2024-06-03 PROCEDURE — 99214 OFFICE O/P EST MOD 30 MIN: CPT | Performed by: INTERNAL MEDICINE

## 2024-06-03 PROCEDURE — 1126F AMNT PAIN NOTED NONE PRSNT: CPT | Performed by: INTERNAL MEDICINE

## 2024-06-03 PROCEDURE — 1159F MED LIST DOCD IN RCRD: CPT | Performed by: INTERNAL MEDICINE

## 2024-06-03 PROCEDURE — 3074F SYST BP LT 130 MM HG: CPT | Performed by: INTERNAL MEDICINE

## 2024-06-03 PROCEDURE — 1160F RVW MEDS BY RX/DR IN RCRD: CPT | Performed by: INTERNAL MEDICINE

## 2024-06-03 PROCEDURE — 3079F DIAST BP 80-89 MM HG: CPT | Performed by: INTERNAL MEDICINE

## 2024-06-03 RX ORDER — ESCITALOPRAM OXALATE 5 MG/1
5 TABLET ORAL DAILY
Start: 2024-06-03

## 2024-06-03 RX ORDER — VARDENAFIL HYDROCHLORIDE 20 MG/1
TABLET ORAL
COMMUNITY
Start: 2024-05-22

## 2024-06-03 RX ORDER — LOSARTAN POTASSIUM 50 MG/1
50 TABLET ORAL DAILY
Qty: 90 TABLET | Refills: 1 | Status: SHIPPED | OUTPATIENT
Start: 2024-06-03

## 2024-06-03 NOTE — PROGRESS NOTES
Subjective        Chief Complaint   Patient presents with    Hypertension           Best Adam is a 73 y.o. male who presents for    Patient Active Problem List   Diagnosis    High cholesterol    Essential hypertension    Other specified hypothyroidism    Anxiety    Tubular adenoma    Bilateral popliteal artery aneurysm    Raynaud's phenomenon without gangrene    Mosley's esophagus without dysplasia    PLMD (periodic limb movement disorder)       History of Present Illness       He does acoustic wave weekly for ED. He was started on clonazepam for PLMD. He was taken off of Lexapro. He is not needing to nap as much. He only has GORDON if he sleeps on his back. He has driving anxiety. 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 He denies chest pain with exertion.   No Known Allergies    Current Outpatient Medications on File Prior to Visit   Medication Sig Dispense Refill    amLODIPine (NORVASC) 5 MG tablet Take 1 tablet by mouth Daily. 90 tablet 2    atorvastatin (LIPITOR) 80 MG tablet Take 1 tablet by mouth Daily. 90 tablet 3    busPIRone (BUSPAR) 10 MG tablet Take 1 tablet by mouth 3 (Three) Times a Day As Needed (anxiety). 90 tablet 2    cholecalciferol (VITAMIN D3) 1000 units tablet Take 1 tablet by mouth Daily.      clonazePAM (KlonoPIN) 0.5 MG tablet Take 1 tablet by mouth Every Night. 30 tablet 5    levothyroxine (SYNTHROID, LEVOTHROID) 100 MCG tablet Take 1 tablet by mouth Daily. 90 tablet 3    Multiple Vitamins-Minerals (OCUVITE ADULT 50+ PO) Take 1 tablet by mouth Daily.      omeprazole (priLOSEC) 20 MG capsule Take 2 capsules by mouth Daily. 180 capsule 3    tadalafil (CIALIS) 5 MG tablet Take 1 tablet by mouth Daily.      Unable to find Take 2 each by mouth Daily. FD Guard      vardenafil (LEVITRA) 20 MG tablet TAKE 1 TABLET BY MOUTH AS NEEDED AS DIRECTED      [DISCONTINUED] escitalopram (LEXAPRO) 5 MG tablet Take 1 tablet by mouth Daily. 90 tablet 3     [DISCONTINUED] losartan (COZAAR) 100 MG tablet Take 0.5 tablets by mouth Daily. 90 tablet 3    [DISCONTINUED] Omega-3 Fatty Acids (FISH OIL PO) Take 2 capsules by mouth Daily. (1000mg) (Patient not taking: Reported on 6/3/2024)       No current facility-administered medications on file prior to visit.       Past Medical History:   Diagnosis Date    COVID-19 01/2022    History of prostate cancer 2011    History of syncope 2006    RBBB 05/07/2019    Renal cyst, right 12/2021    Tubular adenoma 02/23/2022       Past Surgical History:   Procedure Laterality Date    COLONOSCOPY  2012    COLONOSCOPY N/A 07/30/2019    tics, NBIH, TA x 2, HP x 1    COLONOSCOPY  02/23/2022    ENDOSCOPY N/A 07/30/2019    Z line irregular, 39-41 cm from incisors, Chronic H pylori gastritis, Mosley's esophagus    ENDOSCOPY  02/23/2022    INGUINAL HERNIA REPAIR Right 2013    PROSTATECTOMY  2011    UPPER GASTROINTESTINAL ENDOSCOPY  07/30/2019       Family History   Problem Relation Age of Onset    Neuropathy Mother     Alzheimer's disease Father     Lung cancer Father     Colon polyps Father     Macular degeneration Sister     Arthritis Sister     Hearing loss Sister     Breast cancer Sister     Malig Hyperthermia Neg Hx        Social History     Socioeconomic History    Marital status: Single   Tobacco Use    Smoking status: Never    Smokeless tobacco: Never   Vaping Use    Vaping status: Never Used   Substance and Sexual Activity    Alcohol use: Yes     Comment: 2 drinks per day    Drug use: No    Sexual activity: Not Currently     Partners: Female     Birth control/protection: None           The following portions of the patient's history were reviewed and updated as appropriate: problem list, allergies, current medications, past medical history, past family history, past social history, and past surgical history.    Review of Systems    Immunization History   Administered Date(s) Administered    COVID-19 (PFIZER) BIVALENT 12+YRS 12/14/2022,  "05/10/2023    COVID-19 (PFIZER) Purple Cap Monovalent 02/05/2021, 03/15/2021, 09/27/2021    COVID-19 F23 (PFIZER) 12YRS+ (COMIRNATY) 10/18/2023    Covid-19 (Pfizer) Gray Cap Monovalent 07/14/2022    Fluzone High Dose =>65 Years (Vaxcare ONLY) 10/01/2018, 12/15/2019, 10/11/2021    Fluzone High-Dose 65+yrs 10/19/2022, 11/30/2023    Pneumococcal Conjugate 13-Valent (PCV13) 06/25/2019    Pneumococcal Polysaccharide (PPSV23) 06/30/2020    Shingrix 11/16/2022, 02/15/2023       Objective   Vitals:    06/03/24 1338   BP: 122/82   Weight: 99.2 kg (218 lb 12.8 oz)   Height: 180.3 cm (70.98\")     Body mass index is 30.53 kg/m².  Physical Exam  Vitals reviewed.   Constitutional:       Appearance: He is well-developed.   HENT:      Head: Normocephalic and atraumatic.   Cardiovascular:      Rate and Rhythm: Normal rate and regular rhythm.      Heart sounds: Normal heart sounds, S1 normal and S2 normal.   Pulmonary:      Effort: Pulmonary effort is normal.      Breath sounds: Normal breath sounds.   Skin:     General: Skin is warm.   Neurological:      Mental Status: He is alert.   Psychiatric:         Behavior: Behavior normal.         Procedures    Assessment & Plan   Diagnoses and all orders for this visit:    1. Essential hypertension (Primary)  -     losartan (Cozaar) 50 MG tablet; Take 1 tablet by mouth Daily.  Dispense: 90 tablet; Refill: 1  -     Comprehensive Metabolic Panel; Future    2. High cholesterol  -     Lipid Panel With / Chol / HDL Ratio; Future    3. Other specified hypothyroidism  -     TSH; Future    4. PLMD (periodic limb movement disorder)  Comments:  Tolerating clonazepam    5. Anxiety  Comments:  I will r/s his lexapro 5 mg for anxiety with driving.  Orders:  -     escitalopram (LEXAPRO) 5 MG tablet; Take 1 tablet by mouth Daily.    6. H/O prostate cancer  -     PSA DIAGNOSTIC; Future               TSH at goal. He will r/s lexapro. [facility]    Return in about 6 months (around " 12/3/2024) for Medicare Wellness, Lab Before FUP.

## 2024-07-16 DIAGNOSIS — F41.9 ANXIETY: ICD-10-CM

## 2024-07-16 RX ORDER — BUSPIRONE HYDROCHLORIDE 10 MG/1
10 TABLET ORAL 3 TIMES DAILY PRN
Qty: 90 TABLET | Refills: 2 | Status: SHIPPED | OUTPATIENT
Start: 2024-07-16

## 2024-07-16 NOTE — TELEPHONE ENCOUNTER
Caller: Best Adam    Relationship: Self    Best call back number: [phone]     Requested Prescriptions:   Requested Prescriptions     Pending Prescriptions Disp Refills    busPIRone (BUSPAR) 10 MG tablet 90 tablet 2     Sig: Take 1 tablet by mouth 3 (Three) Times a Day As Needed (anxiety).        Pharmacy where request should be sent: [address] [phone] [facility] [phone] FX     Last office visit with prescribing clinician: 6/3/2024   Last telemedicine visit with prescribing clinician: Visit date not found   Next office visit with prescribing clinician: 12/10/2024     Additional details provided by patient:     Does the patient have less than a 3 day supply:  [] Yes  [x] No    Would you like a call back once the refill request has been completed: [] Yes [x] No    If the office needs to give you a call back, can they leave a voicemail: [] Yes [x] No    Bubba Gruber Rep   07/16/24 15:13 EDT

## 2024-07-24 DIAGNOSIS — E03.8 OTHER SPECIFIED HYPOTHYROIDISM: ICD-10-CM

## 2024-07-24 DIAGNOSIS — F41.9 ANXIETY: ICD-10-CM

## 2024-07-24 RX ORDER — ESCITALOPRAM OXALATE 5 MG/1
5 TABLET ORAL DAILY
Qty: 90 TABLET | Refills: 2 | Status: SHIPPED | OUTPATIENT
Start: 2024-07-24

## 2024-07-24 RX ORDER — LEVOTHYROXINE SODIUM 0.1 MG/1
100 TABLET ORAL DAILY
Qty: 90 TABLET | Refills: 2 | Status: SHIPPED | OUTPATIENT
Start: 2024-07-24

## 2024-10-05 DIAGNOSIS — F41.9 ANXIETY: ICD-10-CM

## 2024-10-07 RX ORDER — BUSPIRONE HYDROCHLORIDE 10 MG/1
10 TABLET ORAL 3 TIMES DAILY PRN
Qty: 90 TABLET | Refills: 0 | Status: SHIPPED | OUTPATIENT
Start: 2024-10-07

## 2024-10-10 ENCOUNTER — OFFICE VISIT (OUTPATIENT)
Dept: SLEEP MEDICINE | Facility: HOSPITAL | Age: 74
End: 2024-10-10
Payer: MEDICARE

## 2024-10-10 VITALS
DIASTOLIC BLOOD PRESSURE: 77 MMHG | BODY MASS INDEX: 30.35 KG/M2 | WEIGHT: 216.8 LBS | HEIGHT: 71 IN | SYSTOLIC BLOOD PRESSURE: 116 MMHG | OXYGEN SATURATION: 98 % | HEART RATE: 63 BPM

## 2024-10-10 DIAGNOSIS — G47.61 PLMD (PERIODIC LIMB MOVEMENT DISORDER): ICD-10-CM

## 2024-10-10 PROCEDURE — 99214 OFFICE O/P EST MOD 30 MIN: CPT | Performed by: INTERNAL MEDICINE

## 2024-10-10 PROCEDURE — 3078F DIAST BP <80 MM HG: CPT | Performed by: INTERNAL MEDICINE

## 2024-10-10 PROCEDURE — G0463 HOSPITAL OUTPT CLINIC VISIT: HCPCS

## 2024-10-10 PROCEDURE — 3074F SYST BP LT 130 MM HG: CPT | Performed by: INTERNAL MEDICINE

## 2024-10-10 PROCEDURE — 1160F RVW MEDS BY RX/DR IN RCRD: CPT | Performed by: INTERNAL MEDICINE

## 2024-10-10 PROCEDURE — 1159F MED LIST DOCD IN RCRD: CPT | Performed by: INTERNAL MEDICINE

## 2024-10-10 RX ORDER — CLONAZEPAM 0.5 MG/1
0.5 TABLET ORAL NIGHTLY
Qty: 90 TABLET | Refills: 1 | Status: SHIPPED | OUTPATIENT
Start: 2024-10-10

## 2024-10-10 NOTE — PROGRESS NOTES
"  [facility]  Sleep medicine  [address]  [address]  [place] [address]  Phone   Fax       SLEEP CLINIC FOLLOW UP PROGRESS NOTE.    Best Adam  [id number]   1950  73 y.o.  male      PCP: Tim Kim MD      Date of visit: 10/10/2024    Chief Complaint   Patient presents with    Leg/body Jerks During Sleep       HPI:  This is a 73 y.o. years old patient is here for the management of PLMD.  Patient had a.m. lab follow-up polysomnography which did not show any evidence of sleep apnea his apnea hypopnea index was only 3.1/h.  But patient has significant PLMS with an index of 124/h with a total events of 529 and a PLMS arousals of 85 events with a index of 20/h.  Patient is his clonazepam 0.5 mg which has helped him tremendously and he states that he feels better overall and less anxious in the morning and sleepy.    Medications and allergies are reviewed by me and documented in the encounter.     SOCIAL (habits pertaining to sleep medicine)  History tobacco use:No   History of alcohol use: 14 per week  Caffeine use: 4     REVIEW OF SYSTEMS:   Pertaining positive symptoms are:  [place] Sleepiness Scale :Total score: 5       PHYSICAL EXAMINATION:  CONSTITUTIONAL:  Vitals:    10/10/24 1004   BP: 116/77   Pulse: 63   SpO2: 98%   Weight: 98.3 kg (216 lb 12.8 oz)   Height: 180.3 cm (70.98\")    Body mass index is 30.25 kg/m².   NOSE: nasal passages are clear, No deformities noted   RESP SYSTEM: Not in any respiratory distress, no chest deformities noted,   CARDIOVASULAR: No edema noted  NEURO: Oriented x 3, gait normal,  Mood and affect appeared appropriate    Jamie checked on PDMP, 10/10/2024      ASSESSMENT AND PLAN:  PLMD.  I am going to start him on 0.5 mg of clonazepam to be taken about 30 minutes before bedtime.  I have checked the Jamie.  I sent a prescription for 90 days with 1 refill.  Patient is asked to call back for refill after 6 months and " see me in 1 year for follow-up as the patient is very stable  Return in about 1 year (around 10/10/2025) for Next scheduled follow-up. . Patient's questions were answered.    10/10/2024  Zelda Langley MD  Sleep Medicine.  Medical Director,   [facility], [facility] sleep [facility].

## 2024-10-11 DIAGNOSIS — I10 ESSENTIAL HYPERTENSION: Chronic | ICD-10-CM

## 2024-10-11 RX ORDER — AMLODIPINE BESYLATE 5 MG/1
5 TABLET ORAL DAILY
Qty: 90 TABLET | Refills: 2 | Status: SHIPPED | OUTPATIENT
Start: 2024-10-11

## 2024-10-11 NOTE — TELEPHONE ENCOUNTER
Caller: Best Adam    Relationship: Self    Best call back number:[phone]     Requested Prescriptions:   Requested Prescriptions     Pending Prescriptions Disp Refills    amLODIPine (NORVASC) 5 MG tablet 90 tablet 2     Sig: Take 1 tablet by mouth Daily.        [facility] be sent[facility] [place] [address] [facility] [phone] [phone] FX     Last office visit with prescribing clinician: 6/3/2024   Last telemedicine visit with prescribing clinician: Visit date not found   Next office visit with prescribing clinician: 12/10/2024     Additional details provided by patient: PATIENT STATES HE HAS ENOUGH MEDICATION FOR THE WEEKEND WILL BE OUT AS OF MONDAY     Does the patient have less than a 3 day supply:  [x] Yes  [] No    Bubba Gayle Rep   10/11/24 11:42 EDT

## 2024-10-17 DIAGNOSIS — G47.61 PLMD (PERIODIC LIMB MOVEMENT DISORDER): ICD-10-CM

## 2024-10-17 RX ORDER — CLONAZEPAM 0.5 MG/1
0.5 TABLET ORAL NIGHTLY
Qty: 90 TABLET | Refills: 1 | Status: SHIPPED | OUTPATIENT
Start: 2024-10-17

## 2024-10-31 DIAGNOSIS — F41.9 ANXIETY: ICD-10-CM

## 2024-10-31 RX ORDER — BUSPIRONE HYDROCHLORIDE 10 MG/1
10 TABLET ORAL 3 TIMES DAILY PRN
Qty: 90 TABLET | Refills: 0 | Status: SHIPPED | OUTPATIENT
Start: 2024-10-31

## 2024-11-23 DIAGNOSIS — I10 ESSENTIAL HYPERTENSION: Chronic | ICD-10-CM

## 2024-11-25 RX ORDER — LOSARTAN POTASSIUM 50 MG/1
50 TABLET ORAL DAILY
Qty: 90 TABLET | Refills: 2 | Status: SHIPPED | OUTPATIENT
Start: 2024-11-25

## 2024-11-26 DIAGNOSIS — F41.9 ANXIETY: ICD-10-CM

## 2024-11-26 RX ORDER — BUSPIRONE HYDROCHLORIDE 10 MG/1
10 TABLET ORAL 3 TIMES DAILY PRN
Qty: 90 TABLET | Refills: 0 | Status: SHIPPED | OUTPATIENT
Start: 2024-11-26

## 2024-12-06 DIAGNOSIS — I72.4 BILATERAL POPLITEAL ARTERY ANEURYSM: Primary | ICD-10-CM

## 2024-12-06 DIAGNOSIS — I72.4 ANEURYSM OF ARTERY OF LOWER EXTREMITY: ICD-10-CM

## 2024-12-10 ENCOUNTER — OFFICE VISIT (OUTPATIENT)
Dept: INTERNAL MEDICINE | Facility: CLINIC | Age: 74
End: 2024-12-10
Payer: MEDICARE

## 2024-12-10 VITALS
TEMPERATURE: 98.1 F | WEIGHT: 212 LBS | SYSTOLIC BLOOD PRESSURE: 126 MMHG | DIASTOLIC BLOOD PRESSURE: 84 MMHG | BODY MASS INDEX: 29.68 KG/M2 | HEIGHT: 71 IN

## 2024-12-10 DIAGNOSIS — E03.8 OTHER SPECIFIED HYPOTHYROIDISM: Chronic | ICD-10-CM

## 2024-12-10 DIAGNOSIS — M79.661 RIGHT CALF PAIN: ICD-10-CM

## 2024-12-10 DIAGNOSIS — Z00.00 ENCOUNTER FOR SUBSEQUENT ANNUAL WELLNESS VISIT (AWV) IN MEDICARE PATIENT: Primary | ICD-10-CM

## 2024-12-10 DIAGNOSIS — I10 ESSENTIAL HYPERTENSION: Chronic | ICD-10-CM

## 2024-12-10 DIAGNOSIS — E78.00 HIGH CHOLESTEROL: Chronic | ICD-10-CM

## 2024-12-10 DIAGNOSIS — R73.9 HYPERGLYCEMIA: ICD-10-CM

## 2024-12-10 DIAGNOSIS — F41.9 ANXIETY: ICD-10-CM

## 2024-12-10 PROCEDURE — 3074F SYST BP LT 130 MM HG: CPT | Performed by: INTERNAL MEDICINE

## 2024-12-10 PROCEDURE — 1126F AMNT PAIN NOTED NONE PRSNT: CPT | Performed by: INTERNAL MEDICINE

## 2024-12-10 PROCEDURE — 1159F MED LIST DOCD IN RCRD: CPT | Performed by: INTERNAL MEDICINE

## 2024-12-10 PROCEDURE — G0439 PPPS, SUBSEQ VISIT: HCPCS | Performed by: INTERNAL MEDICINE

## 2024-12-10 PROCEDURE — 1170F FXNL STATUS ASSESSED: CPT | Performed by: INTERNAL MEDICINE

## 2024-12-10 PROCEDURE — 1160F RVW MEDS BY RX/DR IN RCRD: CPT | Performed by: INTERNAL MEDICINE

## 2024-12-10 PROCEDURE — 99214 OFFICE O/P EST MOD 30 MIN: CPT | Performed by: INTERNAL MEDICINE

## 2024-12-10 PROCEDURE — 3079F DIAST BP 80-89 MM HG: CPT | Performed by: INTERNAL MEDICINE

## 2024-12-10 RX ORDER — ESCITALOPRAM OXALATE 10 MG/1
10 TABLET ORAL DAILY
Qty: 30 TABLET | Refills: 4 | Status: SHIPPED | OUTPATIENT
Start: 2024-12-10

## 2024-12-10 NOTE — PROGRESS NOTES
Subjective   The ABCs of the Annual Wellness Visit  Medicare Wellness Visit      Best Adam is a 74 y.o. patient who presents for a Medicare Wellness Visit.    The following portions of the patient's history were reviewed and   updated as appropriate: allergies, current medications, past family history, past medical history, past social history, past surgical history, and problem list.    Compared to one year ago, the patient's physical   health is the same.  Compared to one year ago, the patient's mental   health is the same.    Recent Hospitalizations:  He was not admitted to the hospital during the last year.     Current Medical Providers:  Patient Care Team:  Tim Kim MD as PCP - General (Internal Medicine)    Outpatient Medications Prior to Visit   Medication Sig Dispense Refill    amLODIPine (NORVASC) 5 MG tablet Take 1 tablet by mouth Daily. 90 tablet 2    atorvastatin (LIPITOR) 80 MG tablet Take 1 tablet by mouth Daily. 90 tablet 3    busPIRone (BUSPAR) 10 MG tablet TAKE 1 TABLET BY MOUTH THREE TIMES DAILY AS NEEDED FOR ANXIETY 90 tablet 0    cholecalciferol (VITAMIN D3) 1000 units tablet Take 1 tablet by mouth Daily.      clonazePAM (KlonoPIN) 0.5 MG tablet Take 1 tablet by mouth Every Night. 90 tablet 1    levothyroxine (SYNTHROID, LEVOTHROID) 100 MCG tablet Take 1 tablet by mouth once daily 90 tablet 2    losartan (COZAAR) 50 MG tablet Take 1 tablet by mouth once daily 90 tablet 2    Multiple Vitamins-Minerals (OCUVITE ADULT 50+ PO) Take 1 tablet by mouth Daily.      omeprazole (priLOSEC) 20 MG capsule Take 2 capsules by mouth Daily. 180 capsule 3    tadalafil (CIALIS) 5 MG tablet Take 1 tablet by mouth Daily.      Unable to find Take 2 each by mouth Daily. FD Guard      vardenafil (LEVITRA) 20 MG tablet TAKE 1 TABLET BY MOUTH AS NEEDED AS DIRECTED      escitalopram (LEXAPRO) 5 MG tablet Take 1 tablet by mouth once daily 90 tablet 2     No facility-administered medications prior to visit.  "    No opioid medication identified on active medication list. I have reviewed chart for other potential  high risk medication/s and harmful drug interactions in the elderly.      Aspirin is not on active medication list.  Aspirin use is not indicated based on review of current medical condition/s. Risk of harm outweighs potential benefits.  .    Patient Active Problem List   Diagnosis    High cholesterol    Essential hypertension    Other specified hypothyroidism    Anxiety    Tubular adenoma    Bilateral popliteal artery aneurysm    Raynaud's phenomenon without gangrene    Mosley's esophagus without dysplasia    PLMD (periodic limb movement disorder)     Advance Care Planning Advance Directive is not on file.  ACP discussion was held with the patient during this visit. Patient has an advance directive (not in EMR), copy requested.            Objective   Vitals:    12/10/24 1048   BP: 126/84   Temp: 98.1 °F (36.7 °C)   Weight: 96.2 kg (212 lb)   Height: 180.3 cm (70.98\")   PainSc: 0-No pain       Estimated body mass index is 29.58 kg/m² as calculated from the following:    Height as of this encounter: 180.3 cm (70.98\").    Weight as of this encounter: 96.2 kg (212 lb).    BMI is >= 25 and <30. (Overweight) The following options were offered after discussion;: exercise counseling/recommendations       Does the patient have evidence of cognitive impairment? No  Lab Results   Component Value Date    CHLPL 128 12/03/2024    TRIG 68 12/03/2024    HDL 48 12/03/2024    LDL 66 12/03/2024    VLDL 14 12/03/2024                                                                                                Health  Risk Assessment    Smoking Status:  Social History     Tobacco Use   Smoking Status Never   Smokeless Tobacco Never     Alcohol Consumption:  Social History     Substance and Sexual Activity   Alcohol Use Yes    Comment: 2 drinks per day       Fall Risk Screen  STEADI Fall Risk Assessment was completed, and patient " is at LOW risk for falls.Assessment completed on:12/10/2024    Depression Screening   Little interest or pleasure in doing things? Not at all   Feeling down, depressed, or hopeless? Several days   PHQ-2 Total Score 1      Health Habits and Functional and Cognitive Screenin/10/2024    10:52 AM   Functional & Cognitive Status   Do you have difficulty preparing food and eating? No   Do you have difficulty bathing yourself, getting dressed or grooming yourself? No   Do you have difficulty using the toilet? No   Do you have difficulty moving around from place to place? No   Do you have trouble with steps or getting out of a bed or a chair? No   Current Diet Well Balanced Diet   Dental Exam Up to date   Eye Exam Up to date   Exercise (times per week) 5 times per week   Current Exercises Include Treadmill;Walking   Do you need help using the phone?  No   Are you deaf or do you have serious difficulty hearing?  No   Do you need help to go to places out of walking distance? No   Do you need help shopping? No   Do you need help preparing meals?  No   Do you need help with housework?  No   Do you need help with laundry? No   Do you need help taking your medications? No   Do you need help managing money? No   Do you ever drive or ride in a car without wearing a seat belt? No   Have you felt unusual stress, anger or loneliness in the last month? No   Who do you live with? Alone   If you need help, do you have trouble finding someone available to you? No   Have you been bothered in the last four weeks by sexual problems? No   Do you have difficulty concentrating, remembering or making decisions? No           Age-appropriate Screening Schedule:  Refer to the list below for future screening recommendations based on patient's age, sex and/or medical conditions. Orders for these recommended tests are listed in the plan section. The patient has been provided with a written plan.    Health Maintenance List  Health Maintenance  "  Topic Date Due    COVID-19 Vaccine (9 - 2024-25 season) 09/01/2024    BMI FOLLOWUP  11/30/2024    LIPID PANEL  12/03/2025    ANNUAL WELLNESS VISIT  12/10/2025    COLORECTAL CANCER SCREENING  02/23/2027    TDAP/TD VACCINES (2 - Td or Tdap) 11/13/2034    HEPATITIS C SCREENING  Completed    INFLUENZA VACCINE  Completed    Pneumococcal Vaccine 65+  Completed    ZOSTER VACCINE  Completed                                                                                                                                                CMS Preventative Services Quick Reference  Risk Factors Identified During Encounter  None Identified    The above risks/problems have been discussed with the patient.  Pertinent information has been shared with the patient in the After Visit Summary.  An After Visit Summary and PPPS were made available to the patient.    Follow Up:   Next Medicare Wellness visit to be scheduled in 1 year.         Additional E&M Note during same encounter follows:  Patient has additional, significant, and separately identifiable condition(s)/problem(s) that require work above and beyond the Medicare Wellness Visit     Chief Complaint  Medicare Wellness-subsequent    Subjective   HPI AWV And HTN  Best is also being seen today for an annual adult preventative physical exam.   He has right calf pain about half the days of the week. The pain occurs after he has been laying down. He does not notice with activity. He exercises 5 days per week. Denies chest pain with exertion or dyspnea. He has not been checking his BP. He sees urologist once per year. He does sleep better at night. He needs a nap one hour after eating lunch.     He is blue a lot of the time. He does not helpless. He can feel hopeless. Denies SI.He does not like living in the [facility]. He is politically down.               Objective   Vital Signs:  /84   Temp 98.1 °F (36.7 °C)   Ht 180.3 cm (70.98\")   Wt 96.2 kg (212 lb)   BMI 29.58 kg/m² "   Physical Exam  Vitals reviewed.   Constitutional:       Appearance: He is well-developed.   HENT:      Head: Normocephalic and atraumatic.   Cardiovascular:      Rate and Rhythm: Normal rate and regular rhythm.      Heart sounds: Normal heart sounds, S1 normal and S2 normal.   Pulmonary:      Effort: Pulmonary effort is normal.      Breath sounds: Normal breath sounds.   Abdominal:      Palpations: Abdomen is soft. There is no hepatomegaly or splenomegaly.   Musculoskeletal:      Comments: Right calf non tender    2+ PT on the right. 1+ DP on the right.    Skin:     General: Skin is warm.   Neurological:      Mental Status: He is alert.   Psychiatric:         Behavior: Behavior normal.                 Assessment and Plan               Encounter for subsequent annual wellness visit (AWV) in Medicare patient    High cholesterol     Other specified hypothyroidism    Essential hypertension    Anxiety    Hyperglycemia    Right calf pain    Diagnoses and all orders for this visit:    1. Encounter for subsequent annual wellness visit (AWV) in Medicare patient (Primary)    2. High cholesterol  Comments:  LDL at goal    3. Other specified hypothyroidism  Comments:  TSH at goal    4. Essential hypertension  Comments:  BP at goal    5. Anxiety  -     escitalopram (LEXAPRO) 10 MG tablet; Take 1 tablet by mouth Daily.  Dispense: 30 tablet; Refill: 4    6. Hyperglycemia  -     Hemoglobin A1c; Future    7. Right calf pain  -     Duplex Venous Lower Extremity - Right CAR; Future       New Medications Ordered This Visit   Medications    escitalopram (LEXAPRO) 10 MG tablet     Sig: Take 1 tablet by mouth Daily.     Dispense:  30 tablet     Refill:  4          Follow Up   Return in about 2 months (around 2/10/2025), or 30 minutes, for Lab Before FUP.  Patient was given instructions and counseling regarding his condition or for health maintenance advice. Please see specific information pulled into the AVS if appropriate.    I  reviewed labs. Increase lexapro for anxiety/depression. I will get a venous doppler of right calf. He does have arterial eval in Feb. He does not have with activity. I encouraged him to decrease his alcohol consumption as can affect sugars and emotion.

## 2025-01-17 DIAGNOSIS — E78.49 OTHER HYPERLIPIDEMIA: Chronic | ICD-10-CM

## 2025-01-17 RX ORDER — ATORVASTATIN CALCIUM 80 MG/1
80 TABLET, FILM COATED ORAL DAILY
Qty: 90 TABLET | Refills: 2 | Status: SHIPPED | OUTPATIENT
Start: 2025-01-17

## 2025-01-29 ENCOUNTER — OFFICE VISIT (OUTPATIENT)
Dept: INTERNAL MEDICINE | Facility: CLINIC | Age: 75
End: 2025-01-29
Payer: MEDICARE

## 2025-01-29 ENCOUNTER — TELEPHONE (OUTPATIENT)
Dept: INTERNAL MEDICINE | Facility: CLINIC | Age: 75
End: 2025-01-29
Payer: MEDICARE

## 2025-01-29 ENCOUNTER — HOSPITAL ENCOUNTER (OUTPATIENT)
Facility: HOSPITAL | Age: 75
Discharge: HOME | End: 2025-01-29
Payer: MEDICARE

## 2025-01-29 ENCOUNTER — LAB (OUTPATIENT)
Facility: HOSPITAL | Age: 75
End: 2025-01-29
Payer: MEDICARE

## 2025-01-29 VITALS
TEMPERATURE: 98 F | DIASTOLIC BLOOD PRESSURE: 88 MMHG | BODY MASS INDEX: 29.4 KG/M2 | SYSTOLIC BLOOD PRESSURE: 128 MMHG | HEIGHT: 71 IN | WEIGHT: 210 LBS

## 2025-01-29 DIAGNOSIS — R29.898 WEAKNESS OF BOTH LOWER EXTREMITIES: ICD-10-CM

## 2025-01-29 DIAGNOSIS — R10.9 FLANK PAIN: ICD-10-CM

## 2025-01-29 DIAGNOSIS — M79.10 MYALGIA: ICD-10-CM

## 2025-01-29 DIAGNOSIS — R73.9 ELEVATED BLOOD SUGAR: ICD-10-CM

## 2025-01-29 DIAGNOSIS — R30.0 DYSURIA: Primary | ICD-10-CM

## 2025-01-29 DIAGNOSIS — R06.00 DYSPNEA, UNSPECIFIED TYPE: ICD-10-CM

## 2025-01-29 PROCEDURE — 3074F SYST BP LT 130 MM HG: CPT | Performed by: STUDENT IN AN ORGANIZED HEALTH CARE EDUCATION/TRAINING PROGRAM

## 2025-01-29 PROCEDURE — 1126F AMNT PAIN NOTED NONE PRSNT: CPT | Performed by: STUDENT IN AN ORGANIZED HEALTH CARE EDUCATION/TRAINING PROGRAM

## 2025-01-29 PROCEDURE — 0202U NFCT DS 22 TRGT SARS-COV-2: CPT | Performed by: STUDENT IN AN ORGANIZED HEALTH CARE EDUCATION/TRAINING PROGRAM

## 2025-01-29 PROCEDURE — 72100 X-RAY EXAM L-S SPINE 2/3 VWS: CPT

## 2025-01-29 PROCEDURE — 99214 OFFICE O/P EST MOD 30 MIN: CPT | Performed by: STUDENT IN AN ORGANIZED HEALTH CARE EDUCATION/TRAINING PROGRAM

## 2025-01-29 PROCEDURE — 3079F DIAST BP 80-89 MM HG: CPT | Performed by: STUDENT IN AN ORGANIZED HEALTH CARE EDUCATION/TRAINING PROGRAM

## 2025-01-29 RX ORDER — OXYCODONE AND ACETAMINOPHEN 5; 325 MG/1; MG/1
1 TABLET ORAL EVERY 8 HOURS PRN
Qty: 12 TABLET | Refills: 0 | Status: SHIPPED | OUTPATIENT
Start: 2025-01-29 | End: 2025-01-29 | Stop reason: SDUPTHER

## 2025-01-29 RX ORDER — OXYCODONE AND ACETAMINOPHEN 5; 325 MG/1; MG/1
1 TABLET ORAL EVERY 8 HOURS PRN
Qty: 9 TABLET | Refills: 0 | Status: SHIPPED | OUTPATIENT
Start: 2025-01-29

## 2025-01-29 NOTE — PROGRESS NOTES
"Chief Complaint  Headache and Generalized Body Aches (Patient states he has severe body aches, mostly in legs and neck and a headache for the past 24 hours.Patient says leg pain kept him up all night./Tested negative for covid today at home.)    Subjective        Best Adam presents to [facility]  History of Present Illness  #Myalgias  Symptoms began last week, patient noticed a significant amount of flank pain, started over the weekend improved spontaneously without any significant intervention, on Monday with feeling better went to the gym, woke up on Tuesday and felt something had changed significantly, was having robust body aches, subjective chills.  Headache as well.  His pains were most significant in his legs, they are so significant that they preclude him from sleeping, he has been standing in order to avoid exacerbating his pain.  Any prolonged movement action seems to precipitate worsening pain in his bilateral lower extremities.  Pain starts in the upper thighs, continues in character and quality down to the feet, pain elicited on palpation.  Positive straight leg raise bilaterally.    Objective   Vital Signs:  /88 (BP Location: Left arm, Patient Position: Sitting)   Temp 98 °F (36.7 °C)   Ht 180.3 cm (70.98\")   Wt 95.3 kg (210 lb)   BMI 29.30 kg/m²   Estimated body mass index is 29.3 kg/m² as calculated from the following:    Height as of this encounter: 180.3 cm (70.98\").    Weight as of this encounter: 95.3 kg (210 lb).            Physical Exam  Musculoskeletal:         General: Tenderness present.      Comments: Pain out of proportion to exam bilateral lower extremities, significant weakness, positive straight leg raise bilaterally, although notably pain elicited upon touching of the legs.        Result Review :                Assessment and Plan   Diagnoses and all orders for this visit:    1. Dysuria (Primary)  -     Cancel: Urinalysis With Culture If " Indicated -  -     CBC w AUTO Differential  -     Urinalysis With Culture If Indicated -    2. Flank pain  -     Cancel: Urinalysis With Culture If Indicated -  -     Discontinue: oxyCODONE-acetaminophen (Percocet) 5-325 MG per tablet; Take 1 tablet by mouth Every 8 (Eight) Hours As Needed for Moderate Pain.  Dispense: 12 tablet; Refill: 0  -     XR Spine Lumbar 2 or 3 View; Future  -     Urinalysis With Culture If Indicated -    3. Myalgia  -     CBC w AUTO Differential  -     Comprehensive metabolic panel  -     CK  -     Sedimentation rate, automated  -     Discontinue: oxyCODONE-acetaminophen (Percocet) 5-325 MG per tablet; Take 1 tablet by mouth Every 8 (Eight) Hours As Needed for Moderate Pain.  Dispense: 12 tablet; Refill: 0  -     Respiratory Panel PCR w/COVID-19(SARS-CoV-2) ZOE/J[facility]/ARSH/PAD/COR/[facility] In-House, NP Swab in UTM/VTM, 2 HR TAT - Swab, Nasopharynx    4. Weakness of both lower extremities  -     XR Spine Lumbar 2 or 3 View; Future    5. Elevated blood sugar  -     Hemoglobin A1c    6. Dyspnea, unspecified type  -     Respiratory Panel PCR w/COVID-19(SARS-CoV-2) ZOE/JOB/ARSH/PAD/COR/[facility] In-House, NP Swab in UTM/VTM, 2 HR TAT - Swab, Nasopharynx    Unclear etiology for his myalgias, truthfully I wonder whether this is the sequela of a resolving viral process versus a new process that seems to been precipitated by flank pain last week.  Will initiate broad workup for rhabdomyolysis, CK, sed rate CMP, edition will obtain lumbar x-ray given the weakness.  Due to the pain was so severe it is limiting his ability to sleep will provide short course of opioid analgesia, 3 days should be enough for us to determine what the underlying etiology for symptoms is.  Additional obtain UA in the setting of his flank pain, wonder whether this was all precipitated by urinary tract infection versus some change in his kidney function will see.  Notably patient on 80 mg of atorvastatin, could this be having significant  impact truthfully I think this is not likely given his prolonged use, however it is theoretically possible that some change in his kidney function, possibly precipitated by a transient viral process, has caused a reduction in his overall GFR which has precipitated an increase in his effective dose of atorvastatin?  Possible but not likely.  Will follow the results of blood work and see him next best steps are.  Will order A1c for him to complete in advance of his follow-up with          Follow Up   No follow-ups on file.  Patient was given instructions and counseling regarding his condition or for health maintenance advice. Please see specific information pulled into the AVS if appropriate.

## 2025-01-29 NOTE — TELEPHONE ENCOUNTER
[facility] [address] [place] [address] [facility] [phone] [facility] [phone]      Pharmacy representative phone number: [phone]    What medication are you calling in regards to:     oxyCODONE-acetaminophen (Percocet) 5-325 MG per tablet       What question does the pharmacy have: [facility] CAN ONLY DO 3 DAYS SUPPLY FOR ACUTE INITIAL OPIOID. THEY WILL SEND IN THREE DAYS BUT WILL HAVE TO CONTACT DR. NELSON'S OFFICE TO REQUEST MORE.

## 2025-01-30 LAB
ALBUMIN SERPL-MCNC: 4.6 G/DL (ref 3.5–5.2)
ALBUMIN/GLOB SERPL: 1.5 G/DL
ALP SERPL-CCNC: 86 U/L (ref 39–117)
ALT SERPL-CCNC: 15 U/L (ref 1–41)
APPEARANCE UR: CLEAR
AST SERPL-CCNC: 20 U/L (ref 1–40)
BACTERIA #/AREA URNS HPF: NORMAL /[HPF]
BASOPHILS # BLD AUTO: 0.03 10*3/MM3 (ref 0–0.2)
BASOPHILS NFR BLD AUTO: 0.3 % (ref 0–1.5)
BILIRUB SERPL-MCNC: 1.5 MG/DL (ref 0–1.2)
BILIRUB UR QL STRIP: NEGATIVE
BUN SERPL-MCNC: 11 MG/DL (ref 8–23)
BUN/CREAT SERPL: 9.6 (ref 7–25)
CALCIUM SERPL-MCNC: 9.8 MG/DL (ref 8.6–10.5)
CASTS URNS QL MICRO: NORMAL /LPF
CHLORIDE SERPL-SCNC: 102 MMOL/L (ref 98–107)
CK SERPL-CCNC: 121 U/L (ref 20–200)
CO2 SERPL-SCNC: 20.8 MMOL/L (ref 22–29)
COLOR UR: YELLOW
CREAT SERPL-MCNC: 1.14 MG/DL (ref 0.76–1.27)
EGFRCR SERPLBLD CKD-EPI 2021: 68.3 ML/MIN/1.73
EOSINOPHIL # BLD AUTO: 0 10*3/MM3 (ref 0–0.4)
EOSINOPHIL NFR BLD AUTO: 0 % (ref 0.3–6.2)
EPI CELLS #/AREA URNS HPF: NORMAL /HPF (ref 0–10)
ERYTHROCYTE [DISTWIDTH] IN BLOOD BY AUTOMATED COUNT: 12.5 % (ref 12.3–15.4)
ERYTHROCYTE [SEDIMENTATION RATE] IN BLOOD BY WESTERGREN METHOD: 9 MM/HR (ref 0–20)
GLOBULIN SER CALC-MCNC: 3 GM/DL
GLUCOSE SERPL-MCNC: 96 MG/DL (ref 65–99)
GLUCOSE UR QL STRIP: NEGATIVE
HBA1C MFR BLD: 5.1 % (ref 4.8–5.6)
HCT VFR BLD AUTO: 49.2 % (ref 37.5–51)
HGB BLD-MCNC: 17 G/DL (ref 13–17.7)
HGB UR QL STRIP: NEGATIVE
IMM GRANULOCYTES # BLD AUTO: 0.05 10*3/MM3 (ref 0–0.05)
IMM GRANULOCYTES NFR BLD AUTO: 0.4 % (ref 0–0.5)
KETONES UR QL STRIP: ABNORMAL
LEUKOCYTE ESTERASE UR QL STRIP: NEGATIVE
LYMPHOCYTES # BLD AUTO: 2 10*3/MM3 (ref 0.7–3.1)
LYMPHOCYTES NFR BLD AUTO: 17.9 % (ref 19.6–45.3)
MCH RBC QN AUTO: 31.1 PG (ref 26.6–33)
MCHC RBC AUTO-ENTMCNC: 34.6 G/DL (ref 31.5–35.7)
MCV RBC AUTO: 89.9 FL (ref 79–97)
MICRO URNS: ABNORMAL
MICRO URNS: ABNORMAL
MONOCYTES # BLD AUTO: 0.73 10*3/MM3 (ref 0.1–0.9)
MONOCYTES NFR BLD AUTO: 6.5 % (ref 5–12)
NEUTROPHILS # BLD AUTO: 8.39 10*3/MM3 (ref 1.7–7)
NEUTROPHILS NFR BLD AUTO: 74.9 % (ref 42.7–76)
NITRITE UR QL STRIP: NEGATIVE
NRBC BLD AUTO-RTO: 0 /100 WBC (ref 0–0.2)
PH UR STRIP: 5.5 [PH] (ref 5–7.5)
PLATELET # BLD AUTO: 232 10*3/MM3 (ref 140–450)
POTASSIUM SERPL-SCNC: 4.1 MMOL/L (ref 3.5–5.2)
PROT SERPL-MCNC: 7.6 G/DL (ref 6–8.5)
PROT UR QL STRIP: ABNORMAL
RBC # BLD AUTO: 5.47 10*6/MM3 (ref 4.14–5.8)
RBC #/AREA URNS HPF: NORMAL /HPF (ref 0–2)
SODIUM SERPL-SCNC: 140 MMOL/L (ref 136–145)
SP GR UR STRIP: 1.02 (ref 1–1.03)
URINALYSIS REFLEX: ABNORMAL
UROBILINOGEN UR STRIP-MCNC: 0.2 MG/DL (ref 0.2–1)
WBC # BLD AUTO: 11.2 10*3/MM3 (ref 3.4–10.8)
WBC #/AREA URNS HPF: NORMAL /HPF (ref 0–5)

## 2025-01-30 NOTE — PROGRESS NOTES
Results reviewed, mild leukocytosis, however blood work negative for rhabdo, no evidence of underlying uti. Patient to monitor for improvement, if not better by Monday, call and schedule a follow up.

## 2025-01-31 ENCOUNTER — TELEPHONE (OUTPATIENT)
Dept: INTERNAL MEDICINE | Facility: CLINIC | Age: 75
End: 2025-01-31
Payer: MEDICARE

## 2025-01-31 NOTE — TELEPHONE ENCOUNTER
Caller: Best Adam    Relationship: Self    Best call back number: [phone]     What is the best time to reach you: ANYTIME    Who are you requesting to speak with (clinical staff, provider,  specific staff member): CLINICAL    What was the call regarding: PATIENT CALLING WANTING TO DISCUSS LABS TO SEE IF THE LABS  NEEDS ON 02/03/25 WILL NEED TO BE CHANGED BASED ON LABS HE COMPLETED ON 01/29/25    Is it okay if the provider responds through [facility]: NO

## 2025-02-08 ENCOUNTER — DOCUMENTATION (OUTPATIENT)
Dept: INTERNAL MEDICINE | Facility: CLINIC | Age: 75
End: 2025-02-08
Payer: MEDICARE

## 2025-02-17 ENCOUNTER — OFFICE VISIT (OUTPATIENT)
Dept: INTERNAL MEDICINE | Facility: CLINIC | Age: 75
End: 2025-02-17
Payer: MEDICARE

## 2025-02-17 VITALS
BODY MASS INDEX: 29.57 KG/M2 | WEIGHT: 211.2 LBS | DIASTOLIC BLOOD PRESSURE: 80 MMHG | SYSTOLIC BLOOD PRESSURE: 110 MMHG | HEIGHT: 71 IN

## 2025-02-17 DIAGNOSIS — E03.8 OTHER SPECIFIED HYPOTHYROIDISM: Chronic | ICD-10-CM

## 2025-02-17 DIAGNOSIS — F41.9 ANXIETY: ICD-10-CM

## 2025-02-17 DIAGNOSIS — I10 ESSENTIAL HYPERTENSION: Primary | Chronic | ICD-10-CM

## 2025-02-17 PROCEDURE — G2211 COMPLEX E/M VISIT ADD ON: HCPCS | Performed by: INTERNAL MEDICINE

## 2025-02-17 PROCEDURE — 1159F MED LIST DOCD IN RCRD: CPT | Performed by: INTERNAL MEDICINE

## 2025-02-17 PROCEDURE — 99214 OFFICE O/P EST MOD 30 MIN: CPT | Performed by: INTERNAL MEDICINE

## 2025-02-17 PROCEDURE — 1160F RVW MEDS BY RX/DR IN RCRD: CPT | Performed by: INTERNAL MEDICINE

## 2025-02-17 PROCEDURE — 3074F SYST BP LT 130 MM HG: CPT | Performed by: INTERNAL MEDICINE

## 2025-02-17 PROCEDURE — 3079F DIAST BP 80-89 MM HG: CPT | Performed by: INTERNAL MEDICINE

## 2025-02-17 PROCEDURE — 1126F AMNT PAIN NOTED NONE PRSNT: CPT | Performed by: INTERNAL MEDICINE

## 2025-02-17 NOTE — PROGRESS NOTES
Subjective        Chief Complaint   Patient presents with    Anxiety           Best Adam is a 74 y.o. male who presents for    Patient Active Problem List   Diagnosis    High cholesterol    Essential hypertension    Other specified hypothyroidism    Anxiety    Bilateral popliteal artery aneurysm    Raynaud's phenomenon without gangrene    Mosley's esophagus without dysplasia    PLMD (periodic limb movement disorder)       History of Present Illness       He had an acute illness with pain in both legs in January. He saw Dr. Pop     He has not been checking his BP. He had COVID recently as well.     He is anxious about the country. He does not want to increase his lexapro to 20 mg. He has two drinks per day. Denies SI.  No Known Allergies    Current Outpatient Medications on File Prior to Visit   Medication Sig Dispense Refill    amLODIPine (NORVASC) 5 MG tablet Take 1 tablet by mouth Daily. 90 tablet 2    atorvastatin (LIPITOR) 80 MG tablet Take 1 tablet by mouth once daily 90 tablet 2    busPIRone (BUSPAR) 10 MG tablet TAKE 1 TABLET BY MOUTH THREE TIMES DAILY AS NEEDED FOR ANXIETY 90 tablet 0    cholecalciferol (VITAMIN D3) 1000 units tablet Take 1 tablet by mouth Daily.      clonazePAM (KlonoPIN) 0.5 MG tablet Take 1 tablet by mouth Every Night. 90 tablet 1    escitalopram (LEXAPRO) 10 MG tablet Take 1 tablet by mouth Daily. 30 tablet 4    levothyroxine (SYNTHROID, LEVOTHROID) 100 MCG tablet Take 1 tablet by mouth once daily 90 tablet 2    losartan (COZAAR) 50 MG tablet Take 1 tablet by mouth once daily 90 tablet 2    omeprazole (priLOSEC) 20 MG capsule Take 2 capsules by mouth once daily 180 capsule 0    tadalafil (CIALIS) 5 MG tablet Take 1 tablet by mouth Daily.      Unable to find Take 2 each by mouth Daily. FD Guard      vardenafil (LEVITRA) 20 MG tablet       [DISCONTINUED] Multiple Vitamins-Minerals (OCUVITE ADULT 50+ PO) Take 1 tablet by mouth Daily.      [DISCONTINUED] Nirmatrelvir & Ritonavir,  300mg/100mg, (PAXLOVID) Take 3 tablets by mouth 2 (Two) Times a Day. 30 tablet 0    [DISCONTINUED] oxyCODONE-acetaminophen (Percocet) 5-325 MG per tablet Take 1 tablet by mouth Every 8 (Eight) Hours As Needed for Moderate Pain. 9 tablet 0     No current facility-administered medications on file prior to visit.       Past Medical History:   Diagnosis Date    COVID-19 01/2022    History of prostate cancer 2011    History of syncope 2006    RBBB 05/07/2019    Renal cyst, right 12/2021    Tubular adenoma 02/23/2022       Past Surgical History:   Procedure Laterality Date    COLONOSCOPY  2012    COLONOSCOPY N/A 07/30/2019    tics, NBIH, TA x 2, HP x 1    COLONOSCOPY  02/23/2022    repeat in 5 years    ENDOSCOPY N/A 07/30/2019    Z line irregular, 39-41 cm from incisors, Chronic H pylori gastritis, Mosley's esophagus    ENDOSCOPY  02/23/2022    INGUINAL HERNIA REPAIR Right 2013    PROSTATECTOMY  2011    UPPER GASTROINTESTINAL ENDOSCOPY  07/30/2019       Family History   Problem Relation Age of Onset    Neuropathy Mother     Alzheimer's disease Father     Lung cancer Father     Colon polyps Father     Macular degeneration Sister     Arthritis Sister     Hearing loss Sister     Breast cancer Sister     Malig Hyperthermia Neg Hx        Social History     Socioeconomic History    Marital status: Single   Tobacco Use    Smoking status: Never    Smokeless tobacco: Never   Vaping Use    Vaping status: Never Used   Substance and Sexual Activity    Alcohol use: Yes     Comment: 2 drinks per day    Drug use: No    Sexual activity: Not Currently     Partners: Female     Birth control/protection: None           The following portions of the patient's history were reviewed and updated as appropriate: problem list, allergies, current medications, past medical history, past family history, past social history, and past surgical history.    Review of Systems    Immunization History   Administered Date(s) Administered    Arexvy (RSV,  "Adults 60+ yrs) 01/10/2024    COVID-19 (PFIZER) 12YRS+ (COMIRNATY) 10/18/2023, 07/01/2024    COVID-19 (PFIZER) BIVALENT 12+YRS 12/14/2022, 05/10/2023    COVID-19 (PFIZER) Purple Cap Monovalent 02/05/2021, 03/15/2021, 09/27/2021    Covid-19 (Pfizer) Gray Cap Monovalent 07/14/2022    Fluzone High-Dose 65+YRS 10/01/2018, 12/15/2019, 10/11/2021    Fluzone High-Dose 65+yrs 10/19/2022, 11/30/2023    Influenza, Unspecified 10/14/2024    Pneumococcal Conjugate 13-Valent (PCV13) 06/25/2019    Pneumococcal Polysaccharide (PPSV23) 06/30/2020    Shingrix 11/16/2022, 02/15/2023    Tdap 11/13/2024       Objective   Vitals:    02/17/25 0906   BP: 110/80   Weight: 95.8 kg (211 lb 3.2 oz)   Height: 180.3 cm (70.98\")     Body mass index is 29.47 kg/m².  Physical Exam  Vitals reviewed.   Constitutional:       Appearance: He is well-developed.   HENT:      Head: Normocephalic and atraumatic.   Cardiovascular:      Rate and Rhythm: Normal rate and regular rhythm.      Heart sounds: Normal heart sounds, S1 normal and S2 normal.   Pulmonary:      Effort: Pulmonary effort is normal.      Breath sounds: Normal breath sounds.   Skin:     General: Skin is warm.   Neurological:      Mental Status: He is alert.   Psychiatric:         Behavior: Behavior normal.         Procedures    Assessment & Plan   Diagnoses and all orders for this visit:    1. Essential hypertension (Primary)  Comments:  BP is good    2. Other specified hypothyroidism  -     TSH; Future    3. Anxiety  Comments:  Declines increasing lexapro. Recc decreasing etoh more.               Reviewed cmp, cbc, ua, and xray of l spine. BP is good. Declines increasing lexapro.    Return in about 19 weeks (around 6/30/2025), or 30 minutes, for Lab Before FUP.  "

## 2025-03-14 RX ORDER — OMEPRAZOLE 20 MG/1
40 CAPSULE, DELAYED RELEASE ORAL DAILY
Qty: 180 CAPSULE | Refills: 2 | Status: SHIPPED | OUTPATIENT
Start: 2025-03-14

## 2025-04-11 ENCOUNTER — TELEPHONE (OUTPATIENT)
Dept: INTERNAL MEDICINE | Facility: CLINIC | Age: 75
End: 2025-04-11
Payer: MEDICARE

## 2025-04-11 DIAGNOSIS — F41.9 ANXIETY: ICD-10-CM

## 2025-04-11 RX ORDER — ESCITALOPRAM OXALATE 10 MG/1
10 TABLET ORAL DAILY
Qty: 90 TABLET | Refills: 4 | Status: SHIPPED | OUTPATIENT
Start: 2025-04-11

## 2025-04-11 NOTE — TELEPHONE ENCOUNTER
Caller: Best Adam    Relationship: Self    Best call back number: [phone]     Requested Prescriptions: escitalopram (LEXAPRO) 10 MG tablet   Requested Prescriptions      No prescriptions requested or ordered in this encounter      Pharmacy where request should be sent:    [address] [place]  [address] [facility] [phone] [facility] [phone]  [phone]   Last office visit with prescribing clinician: 2/17/2025   Last telemedicine visit with prescribing clinician: Visit date not found   Next office visit with prescribing clinician: 6/27/2025     Additional details provided by patient: PATIENT IS CALLING TO STATE HE NEVER PICKED UP THE ABOVE PRESCRIPTION BECAUSE HE HAD REFILL REMAINING OF THE 5 MG.  HE STATES THE PHARMACY WAS FILLING THE OLD PRESCRIPTION OF 5 MG AND HE HAS BEEN TAKING 2 OF THE 5 MG.  HE  NEEDS A NEW PRESCRIPTION TO REFLECT THE NEW DOSE OF 10 MG.  HE IS WANTING TO KNOW IF HE CAN GET A 90 DAY PRESCRIPTION. HE STATES HE WILL RUN OUT AFTER TOMORROW.    Does the patient have less than a 3 day supply:  [x] Yes  [] No    Would you like a call back once the refill request has been completed: [] Yes [] No    If the office needs to give you a call back, can they leave a voicemail: [] Yes [] No    Bubba Rainey Rep   04/11/25 11:27 EDT     PLEASE ADVISE.

## 2025-04-21 DIAGNOSIS — E03.8 OTHER SPECIFIED HYPOTHYROIDISM: ICD-10-CM

## 2025-04-21 RX ORDER — LEVOTHYROXINE SODIUM 100 UG/1
100 TABLET ORAL DAILY
Qty: 90 TABLET | Refills: 0 | Status: SHIPPED | OUTPATIENT
Start: 2025-04-21

## 2025-04-25 ENCOUNTER — OFFICE VISIT (OUTPATIENT)
Age: 75
End: 2025-04-25
Payer: MEDICARE

## 2025-04-25 ENCOUNTER — HOSPITAL ENCOUNTER (OUTPATIENT)
Facility: HOSPITAL | Age: 75
Discharge: HOME | End: 2025-04-25
Payer: MEDICARE

## 2025-04-25 VITALS
SYSTOLIC BLOOD PRESSURE: 109 MMHG | RESPIRATION RATE: 16 BRPM | WEIGHT: 214.1 LBS | BODY MASS INDEX: 29.97 KG/M2 | HEIGHT: 71 IN | DIASTOLIC BLOOD PRESSURE: 72 MMHG

## 2025-04-25 DIAGNOSIS — I72.4 BILATERAL POPLITEAL ARTERY ANEURYSM: ICD-10-CM

## 2025-04-25 DIAGNOSIS — I72.4 BILATERAL POPLITEAL ARTERY ANEURYSM: Primary | ICD-10-CM

## 2025-04-25 DIAGNOSIS — I72.4 ANEURYSM OF ARTERY OF LOWER EXTREMITY: ICD-10-CM

## 2025-04-25 LAB
BH CV LEA LEFT POPITEAL A  DISTAL PSV: -51 CM/S
BH CV LEA LEFT POPITEAL A  MID PSV: 32 CM/S
BH CV LEA LEFT POPITEAL A  PROX PSV: 35.7 CM/S
BH CV LEA LEFT PTA PROX PSV: -34.7 CM/S
BH CV LEA LEFT SFA DISTAL PSV: -44.2 CM/S
BH CV LEA RIGHT POPITEAL A  DISTAL PSV: -56 CM/S
BH CV LEA RIGHT POPITEAL A  MID PSV: 37 CM/S
BH CV LEA RIGHT POPITEAL A  PROX PSV: 49.1 CM/S
BH CV LEA RIGHT SFA DISTAL PSV: -51.1 CM/S
BH CV LEA RIGHT TIBEOPERONEAL PSV: 58 CM/S
BH CV VAS LEA LEFT POPLITEAL DISTAL ANEURYSM LONG: 0.8 CM
BH CV VAS LEA LEFT POPLITEAL DISTAL ANEURYSM TRANS: 0.8 CM
BH CV VAS LEA LEFT POPLITEAL MID ANEURYSM LONG: 1.4 CM
BH CV VAS LEA LEFT POPLITEAL MID ANEURYSM TRANS: 1.3 CM
BH CV VAS LEA LEFT POPLITEAL PROX ANEURYSM LONG: 1.5 CM
BH CV VAS LEA LEFT POPLITEAL PROX ANEURYSM TRANS: 1.5 CM
BH CV VAS LEA LEFT SFA DISTAL ANEURYSM LONG: 1.3 CM
BH CV VAS LEA LEFT SFA DISTAL ANEURYSM TRANS: 1.3 CM
BH CV VAS LEA RIGHT POPLITEAL DISTAL ANEURYSM LONG: 0.9 CM
BH CV VAS LEA RIGHT POPLITEAL DISTAL ANEURYSM TRANS: 0.9 CM
BH CV VAS LEA RIGHT POPLITEAL MID ANEURYSM LONG: 1.1 CM
BH CV VAS LEA RIGHT POPLITEAL MID ANEURYSM TRANS: 1.1 CM
BH CV VAS LEA RIGHT POPLITEAL PROX ANEURYSM LONG: 1.3 CM
BH CV VAS LEA RIGHT POPLITEAL PROX ANEURYSM TRANS: 1.4 CM
BH CV VAS LEA RIGHT SFA DISTAL ANEURYSM LONG: 1.3 CM
BH CV VAS LEA RIGHT SFA DISTAL ANEURYSM TRANS: 1.4 CM

## 2025-04-25 PROCEDURE — 93925 LOWER EXTREMITY STUDY: CPT

## 2025-05-19 DIAGNOSIS — G47.61 PLMD (PERIODIC LIMB MOVEMENT DISORDER): ICD-10-CM

## 2025-05-19 RX ORDER — CLONAZEPAM 0.5 MG/1
0.5 TABLET ORAL NIGHTLY
Qty: 90 TABLET | Refills: 1 | Status: SHIPPED | OUTPATIENT
Start: 2025-05-19

## 2025-06-12 DIAGNOSIS — E03.8 OTHER SPECIFIED HYPOTHYROIDISM: Chronic | ICD-10-CM

## 2025-06-21 LAB — TSH SERPL DL<=0.005 MIU/L-ACNC: 0.4 UIU/ML (ref 0.27–4.2)

## 2025-06-27 ENCOUNTER — OFFICE VISIT (OUTPATIENT)
Dept: INTERNAL MEDICINE | Facility: CLINIC | Age: 75
End: 2025-06-27
Payer: MEDICARE

## 2025-06-27 VITALS
DIASTOLIC BLOOD PRESSURE: 80 MMHG | WEIGHT: 211.8 LBS | BODY MASS INDEX: 29.65 KG/M2 | HEIGHT: 71 IN | SYSTOLIC BLOOD PRESSURE: 122 MMHG

## 2025-06-27 DIAGNOSIS — F41.9 ANXIETY: ICD-10-CM

## 2025-06-27 DIAGNOSIS — E03.8 OTHER SPECIFIED HYPOTHYROIDISM: Chronic | ICD-10-CM

## 2025-06-27 DIAGNOSIS — I10 ESSENTIAL HYPERTENSION: Primary | Chronic | ICD-10-CM

## 2025-06-27 DIAGNOSIS — Z85.46 H/O PROSTATE CANCER: ICD-10-CM

## 2025-06-27 RX ORDER — AMLODIPINE BESYLATE 5 MG/1
5 TABLET ORAL DAILY
Qty: 90 TABLET | Refills: 2 | Status: SHIPPED | OUTPATIENT
Start: 2025-06-27

## 2025-06-27 NOTE — PROGRESS NOTES
Subjective        Chief Complaint   Patient presents with    Hypothyroidism           Best Adam is a 74 y.o. male who presents for    Patient Active Problem List   Diagnosis    High cholesterol    Essential hypertension    Other specified hypothyroidism    Anxiety    Bilateral popliteal artery aneurysm    Raynaud's phenomenon without gangrene    Mosley's esophagus without dysplasia    PLMD (periodic limb movement disorder)       History of Present Illness       He does not check his BP. Denies chest pain or dyspnea. He exercises 3-4 days per week. He saw derm yesterday and was told to come back in a year. He saw Dr. Gray and his popliteal aneurysm is unchanged. He thinks increasing lexapro from 5 to 10 mg daily and it has helped. He sleeps 9 hours per night and he is rested. He takes a 2 hour nap in the afternoon. He is depressed sometimes. Denies SI.   No Known Allergies    Current Outpatient Medications on File Prior to Visit   Medication Sig Dispense Refill    atorvastatin (LIPITOR) 80 MG tablet Take 1 tablet by mouth once daily 90 tablet 2    busPIRone (BUSPAR) 10 MG tablet TAKE 1 TABLET BY MOUTH THREE TIMES DAILY AS NEEDED FOR ANXIETY 90 tablet 0    cholecalciferol (VITAMIN D3) 1000 units tablet Take 1 tablet by mouth Daily.      clonazePAM (KlonoPIN) 0.5 MG tablet Take 1 tablet by mouth Every Night. 90 tablet 1    escitalopram (LEXAPRO) 10 MG tablet Take 1 tablet by mouth Daily. 90 tablet 4    levothyroxine (SYNTHROID, LEVOTHROID) 100 MCG tablet Take 1 tablet by mouth once daily 90 tablet 0    losartan (COZAAR) 50 MG tablet Take 1 tablet by mouth once daily 90 tablet 2    omeprazole (priLOSEC) 20 MG capsule Take 2 capsules by mouth once daily 180 capsule 2    tadalafil (CIALIS) 5 MG tablet Take 1 tablet by mouth Daily.      Unable to find Take 2 each by mouth Daily. FD Guard      vardenafil (LEVITRA) 20 MG tablet       [DISCONTINUED] amLODIPine (NORVASC) 5 MG tablet Take 1 tablet by mouth Daily. 90  tablet 2     No current facility-administered medications on file prior to visit.       Past Medical History:   Diagnosis Date    COVID-19 01/2022    History of prostate cancer 2011    History of syncope 2006    RBBB 05/07/2019    Renal cyst, right 12/2021    Tubular adenoma 02/23/2022       Past Surgical History:   Procedure Laterality Date    COLONOSCOPY  2012    COLONOSCOPY N/A 07/30/2019    tics, NBIH, TA x 2, HP x 1    COLONOSCOPY  02/23/2022    repeat in 5 years    ENDOSCOPY N/A 07/30/2019    Z line irregular, 39-41 cm from incisors, Chronic H pylori gastritis, Mosley's esophagus    ENDOSCOPY  02/23/2022    INGUINAL HERNIA REPAIR Right 2013    PROSTATECTOMY  2011    UPPER GASTROINTESTINAL ENDOSCOPY  07/30/2019       Family History   Problem Relation Age of Onset    Neuropathy Mother     Alzheimer's disease Father     Lung cancer Father     Colon polyps Father     Macular degeneration Sister     Arthritis Sister     Hearing loss Sister     Breast cancer Sister     Malig Hyperthermia Neg Hx        Social History     Socioeconomic History    Marital status: Single   Tobacco Use    Smoking status: Never     Passive exposure: Never    Smokeless tobacco: Never   Vaping Use    Vaping status: Never Used   Substance and Sexual Activity    Alcohol use: Yes     Comment: 2 drinks per day    Drug use: No    Sexual activity: Not Currently     Partners: Female     Birth control/protection: None           The following portions of the patient's history were reviewed and updated as appropriate: problem list, allergies, current medications, past medical history, past family history, past social history, and past surgical history.    Review of Systems    Immunization History   Administered Date(s) Administered    Arexvy (RSV, Adults 60+ yrs) 01/10/2024    COVID-19 (PFIZER) 12YRS+ (COMIRNATY) 10/18/2023, 07/01/2024    COVID-19 (PFIZER) BIVALENT 12+YRS 12/14/2022, 05/10/2023    COVID-19 (PFIZER) Purple Cap Monovalent 02/05/2021,  "03/15/2021, 09/27/2021    Covid-19 (Pfizer) Gray Cap Monovalent 07/14/2022    Fluzone High-Dose 65+YRS 10/01/2018, 12/15/2019, 10/11/2021    Fluzone High-Dose 65+yrs 10/19/2022, 11/30/2023    Influenza, Unspecified 10/14/2024    Pneumococcal Conjugate 13-Valent (PCV13) 06/25/2019    Pneumococcal Polysaccharide (PPSV23) 06/30/2020    Shingrix 11/16/2022, 02/15/2023    Tdap 11/13/2024       Objective   Vitals:    06/27/25 1300   BP: 122/80   Weight: 96.1 kg (211 lb 12.8 oz)   Height: 180.3 cm (70.98\")     Body mass index is 29.55 kg/m².  Physical Exam  Vitals reviewed.   Constitutional:       Appearance: He is well-developed.   HENT:      Head: Normocephalic and atraumatic.   Cardiovascular:      Rate and Rhythm: Normal rate and regular rhythm.      Heart sounds: Normal heart sounds, S1 normal and S2 normal.   Pulmonary:      Effort: Pulmonary effort is normal.      Breath sounds: Normal breath sounds.   Skin:     General: Skin is warm.   Neurological:      Mental Status: He is alert.   Psychiatric:         Behavior: Behavior normal.         Procedures    Assessment & Plan   Diagnoses and all orders for this visit:    1. Essential hypertension (Primary)  -     amLODIPine (NORVASC) 5 MG tablet; Take 1 tablet by mouth Daily.  Dispense: 90 tablet; Refill: 2  -     Comprehensive Metabolic Panel; Future  -     Lipid Panel With / Chol / HDL Ratio; Future    2. Other specified hypothyroidism  -     TSH; Future    3. Anxiety  Comments:  Denies increasing lexapro for depression    4. H/O prostate cancer  -     PSA DIAGNOSTIC; Future                 TSH at goal. Encouraged him to f/u with sleep med since needs a 2 hour nap in the afternoon for a long time. BP is good.   Return in about 5 months (around 12/11/2025) for Medicare Wellness, Lab Before FUP.  "

## 2025-07-15 DIAGNOSIS — E03.8 OTHER SPECIFIED HYPOTHYROIDISM: ICD-10-CM

## 2025-07-15 RX ORDER — LEVOTHYROXINE SODIUM 100 UG/1
100 TABLET ORAL DAILY
Qty: 90 TABLET | Refills: 3 | Status: SHIPPED | OUTPATIENT
Start: 2025-07-15

## 2025-07-15 NOTE — TELEPHONE ENCOUNTER
Caller: Best Adam    Relationship: Self    Best call back number: [phone]     Requested Prescriptions:   Requested Prescriptions     Pending Prescriptions Disp Refills    levothyroxine (SYNTHROID, LEVOTHROID) 100 MCG tablet 90 tablet 0     Sig: Take 1 tablet by mouth Daily.        Pharmacy where request should be sent: [address] [phone] [facility] [phone] FX     Last office visit with prescribing clinician: 6/27/2025   Last telemedicine visit with prescribing clinician: Visit date not found   Next office visit with prescribing clinician: 12/12/2025     Additional details provided by patient:     Does the patient have less than a 3 day supply:  [x] Yes  [] No    Would you like a call back once the refill request has been completed: [] Yes [] No    If the office needs to give you a call back, can they leave a voicemail: [] Yes [] No    Bubba Kearns Rep   07/15/25 09:53 EDT

## 2025-08-12 DIAGNOSIS — I10 ESSENTIAL HYPERTENSION: Chronic | ICD-10-CM

## 2025-08-12 RX ORDER — LOSARTAN POTASSIUM 50 MG/1
50 TABLET ORAL DAILY
Qty: 90 TABLET | Refills: 2 | Status: SHIPPED | OUTPATIENT
Start: 2025-08-12

## (undated) DEVICE — Device: Brand: DEFENDO AIR/WATER/SUCTION AND BIOPSY VALVE

## (undated) DEVICE — BITEBLOCK OMNI BLOC

## (undated) DEVICE — CANN NASL CO2 TRULINK W/O2 A/

## (undated) DEVICE — THE TORRENT IRRIGATION SCOPE CONNECTOR IS USED WITH THE TORRENT IRRIGATION TUBING TO PROVIDE IRRIGATION FLUIDS SUCH AS STERILE WATER DURING GASTROINTESTINAL ENDOSCOPIC PROCEDURES WHEN USED IN CONJUNCTION WITH AN IRRIGATION PUMP (OR ELECTROSURGICAL UNIT).: Brand: TORRENT

## (undated) DEVICE — PAD GRND REM POLYHESIVE A/ DISP

## (undated) DEVICE — THE SINGLE USE ETRAP – POLYP TRAP IS USED FOR SUCTION RETRIEVAL OF ENDOSCOPICALLY REMOVED POLYPS.: Brand: ETRAP

## (undated) DEVICE — SNAR POLYP SENSATION STDOVL 27 240 BX40

## (undated) DEVICE — SINGLE-USE BIOPSY FORCEPS: Brand: RADIAL JAW 4

## (undated) DEVICE — SENSR O2 OXIMAX FNGR A/ 18IN NONSTR

## (undated) DEVICE — TUBING, SUCTION, 1/4" X 10', STRAIGHT: Brand: MEDLINE